# Patient Record
Sex: FEMALE | Race: WHITE | NOT HISPANIC OR LATINO | Employment: UNEMPLOYED | URBAN - METROPOLITAN AREA
[De-identification: names, ages, dates, MRNs, and addresses within clinical notes are randomized per-mention and may not be internally consistent; named-entity substitution may affect disease eponyms.]

---

## 2023-07-21 LAB
HEPATITIS B VIRUS SURFACE AG PRESENCE IN SERUM: NONREACTIVE
HEPATITIS C VIRUS AB PRESENCE IN SERUM: NONREACTIVE
HIV 1/ 2 AG/AB SCREEN: NONREACTIVE
SYPHILIS TOTAL AB: NONREACTIVE

## 2023-07-25 LAB
CHLAMYDIA TRACH., AMPLIFIED: NEGATIVE
N. GONORRHEA, AMPLIFIED: NEGATIVE

## 2023-09-29 ENCOUNTER — HOME HEALTH ADMISSION (OUTPATIENT)
Dept: HOME HEALTH SERVICES | Facility: HOME HEALTH | Age: 28
End: 2023-09-29
Payer: COMMERCIAL

## 2023-10-13 ENCOUNTER — HOME INFUSION (OUTPATIENT)
Dept: INFUSION THERAPY | Age: 28
End: 2023-10-13
Payer: COMMERCIAL

## 2023-10-13 NOTE — PROGRESS NOTES
Patient receiving Entyvio 300mg [id number], next infusion has been scheduled for 10/15/23. New order rec'd, good thru 9/5/24. Per previous note patient no longer uses benadryl premed and tylenol only sometimes.    Auth good through 12/31/23, October FO complete    Pharmacy to deliver 10/13 for 10/15 infusion as follows  #1 Entyvio vial  #1 SWFI vial  #1 NS 250ml bag  #1 NS 50ml bag  #2 APAP tabs    Next dose due approx 11/12    FU 11/06/23 to check next infusion date

## 2023-10-17 ENCOUNTER — HOME CARE VISIT (OUTPATIENT)
Dept: HOME HEALTH SERVICES | Facility: HOME HEALTH | Age: 28
End: 2023-10-17
Payer: COMMERCIAL

## 2023-10-17 VITALS
HEIGHT: 64 IN | SYSTOLIC BLOOD PRESSURE: 122 MMHG | DIASTOLIC BLOOD PRESSURE: 64 MMHG | RESPIRATION RATE: 12 BRPM | TEMPERATURE: 96.8 F | HEART RATE: 70 BPM | BODY MASS INDEX: 26.95 KG/M2

## 2023-10-17 PROCEDURE — G0299 HHS/HOSPICE OF RN EA 15 MIN: HCPCS

## 2023-10-17 ASSESSMENT — ENCOUNTER SYMPTOMS
DENIES PAIN: 1
LAST BOWEL MOVEMENT: 66764
APPETITE LEVEL: GOOD
LOSS OF SENSATION IN FEET: 0
DEPRESSION: 0
LOWEST PAIN SEVERITY IN PAST 24 HOURS: 0/10
OCCASIONAL FEELINGS OF UNSTEADINESS: 0
CHANGE IN APPETITE: UNCHANGED
HIGHEST PAIN SEVERITY IN PAST 24 HOURS: 2/10
STOOL FREQUENCY: DAILY
SUBJECTIVE PAIN PROGRESSION: UNCHANGED
PAIN SEVERITY GOAL: 0/10
PERSON REPORTING PAIN: PATIENT

## 2023-10-17 ASSESSMENT — PAIN SCALES - PAIN ASSESSMENT IN ADVANCED DEMENTIA (PAINAD)
BODYLANGUAGE: 0 - RELAXED.
FACIALEXPRESSION: 0 - SMILING OR INEXPRESSIVE.
TOTALSCORE: 0
FACIALEXPRESSION: 0
CONSOLABILITY: 0
CONSOLABILITY: 0 - NO NEED TO CONSOLE.
BREATHING: 0
BODYLANGUAGE: 0
NEGVOCALIZATION: 0 - NONE.
NEGVOCALIZATION: 0

## 2023-10-17 ASSESSMENT — ACTIVITIES OF DAILY LIVING (ADL)
OASIS_M1830: 00
AMBULATION ASSISTANCE: INDEPENDENT
ENTERING_EXITING_HOME: INDEPENDENT
AMBULATION ASSISTANCE: 1

## 2023-10-19 ENCOUNTER — HOME CARE VISIT (OUTPATIENT)
Dept: HOME HEALTH SERVICES | Facility: HOME HEALTH | Age: 28
End: 2023-10-19
Payer: COMMERCIAL

## 2023-10-19 ENCOUNTER — TELEPHONE (OUTPATIENT)
Dept: GASTROENTEROLOGY | Facility: CLINIC | Age: 28
End: 2023-10-19
Payer: COMMERCIAL

## 2023-10-19 VITALS
RESPIRATION RATE: 16 BRPM | SYSTOLIC BLOOD PRESSURE: 118 MMHG | DIASTOLIC BLOOD PRESSURE: 64 MMHG | TEMPERATURE: 98.4 F | HEART RATE: 78 BPM

## 2023-10-19 PROCEDURE — 400014 HH F/U

## 2023-10-19 PROCEDURE — G0299 HHS/HOSPICE OF RN EA 15 MIN: HCPCS

## 2023-10-19 SDOH — ECONOMIC STABILITY: GENERAL

## 2023-10-19 ASSESSMENT — PAIN SCALES - PAIN ASSESSMENT IN ADVANCED DEMENTIA (PAINAD)
CONSOLABILITY: 0
BODYLANGUAGE: 0
NEGVOCALIZATION: 0 - NONE.
CONSOLABILITY: 0 - NO NEED TO CONSOLE.
BODYLANGUAGE: 0 - RELAXED.
FACIALEXPRESSION: 0 - SMILING OR INEXPRESSIVE.
NEGVOCALIZATION: 0
FACIALEXPRESSION: 0
TOTALSCORE: 0
BREATHING: 0

## 2023-10-19 ASSESSMENT — ENCOUNTER SYMPTOMS
APPETITE LEVEL: FAIR
DENIES PAIN: 1
OCCASIONAL FEELINGS OF UNSTEADINESS: 0

## 2023-10-19 ASSESSMENT — ACTIVITIES OF DAILY LIVING (ADL): MONEY MANAGEMENT (EXPENSES/BILLS): INDEPENDENT

## 2023-10-19 NOTE — HOME HEALTH
sn arrived and attempted x3 to start peripheral line unscussesfully,  good blood flash and then will not flush.   called Dr Riverafor office and spoke with Alessia and reported above info.    requested need for alteranate solution to recieve entyvio soon, question mediport placement or infusion center for the short term .  awaiting a return call from md.   called  pharmacy to report that med not mixed or infused spoke with Kevin.

## 2023-10-19 NOTE — TELEPHONE ENCOUNTER
Marissa from [facility] called in to let us know that they were unable to get an IV started for patient to do her Entyvio treatment.  It was attempted 4 times on 10/13 and 3 times today.  They are wanting to know if you could give an order to get a port placed for this patient as she has always been a hard stick and it keeps getting harder.  They also want to know what to do about her not being able to get her Entyvio treatment today.    When calling [facility] - we will need to ask for team 7.

## 2023-10-25 PROCEDURE — G0179 MD RECERTIFICATION HHA PT: HCPCS | Performed by: INTERNAL MEDICINE

## 2023-11-03 ENCOUNTER — HOME INFUSION (OUTPATIENT)
Dept: INFUSION THERAPY | Age: 28
End: 2023-11-03
Payer: COMMERCIAL

## 2023-11-03 NOTE — PROGRESS NOTES
GI office still waiting for pt to schedule appt. RN has made multiple unsuccessful attempts at placing peripheral line to administer Entyvio.    [facility] to FU 11/13 to check appt status and pt needs

## 2023-11-08 ENCOUNTER — TELEPHONE (OUTPATIENT)
Dept: OBSTETRICS AND GYNECOLOGY | Facility: CLINIC | Age: 28
End: 2023-11-08
Payer: COMMERCIAL

## 2023-11-08 NOTE — TELEPHONE ENCOUNTER
LV with Beti was 7/20/2023  Patient states that urine urgency started after breaking her leg in 2021, states she did not have an internal catheter during this procedure but symptoms did start following this procedure.   Patient requesting a visit for evaluation, discuss symptoms and possible medical plan. Patient was scheduled 12/7/2023, she also requested I forward this information to Beti martinezase she wanted her seen sooner or if labs needed to be ordered or a referral to urology? Please advise.

## 2023-11-08 NOTE — TELEPHONE ENCOUNTER
Patient called stating she broke her leg years ago. She was given fentanyl. She doesn't know if it was the mass amount of anesthesia they gave her but her bladder control has not been the same since the surgery. The urgency is bad. No pain or blood when urinating. She would like to talk about this.

## 2023-11-09 NOTE — TELEPHONE ENCOUNTER
I am happy to see her on 12/7. We can determine if she needs a referral to urology then.   Thanks!

## 2023-11-13 ENCOUNTER — HOME INFUSION (OUTPATIENT)
Dept: INFUSION THERAPY | Age: 28
End: 2023-11-13
Payer: COMMERCIAL

## 2023-11-13 NOTE — PROGRESS NOTES
Rp spoke with pt, confirmed appt set up 12/19 with GI to discuss port placement. She also confirmed she was not able to receive infusion in October as RN was unable to get peripheral stick across multiple visits/practitioners. Confirmed medication is still in fridge at home. She is agreeable to holding infusions until discussing port placement with Dr Rasheed.     FU 12/19 with results of appt and forward in book as appropriate    Only saline bags will be needed at time of next fill

## 2023-11-17 ENCOUNTER — HOME CARE VISIT (OUTPATIENT)
Dept: HOME HEALTH SERVICES | Facility: HOME HEALTH | Age: 28
End: 2023-11-17
Payer: COMMERCIAL

## 2023-12-07 ENCOUNTER — OFFICE VISIT (OUTPATIENT)
Dept: OBSTETRICS AND GYNECOLOGY | Facility: CLINIC | Age: 28
End: 2023-12-07
Payer: COMMERCIAL

## 2023-12-07 VITALS — BODY MASS INDEX: 27.98 KG/M2 | WEIGHT: 163 LBS | SYSTOLIC BLOOD PRESSURE: 130 MMHG | DIASTOLIC BLOOD PRESSURE: 80 MMHG

## 2023-12-07 DIAGNOSIS — N89.8 VAGINAL DISCHARGE: ICD-10-CM

## 2023-12-07 DIAGNOSIS — R32 URINARY INCONTINENCE, UNSPECIFIED TYPE: Primary | ICD-10-CM

## 2023-12-07 DIAGNOSIS — N32.81 OAB (OVERACTIVE BLADDER): ICD-10-CM

## 2023-12-07 LAB
POC BILIRUBIN, URINE: NEGATIVE
POC BLOOD, URINE: NEGATIVE
POC GLUCOSE, URINE: NEGATIVE MG/DL
POC KETONES, URINE: NEGATIVE MG/DL
POC LEUKOCYTES, URINE: ABNORMAL
POC NITRITE,URINE: NEGATIVE
POC PH, URINE: 5 PH
POC PROTEIN, URINE: NEGATIVE MG/DL
POC SPECIFIC GRAVITY, URINE: 1.01
POC UROBILINOGEN, URINE: 0.2 EU/DL

## 2023-12-07 PROCEDURE — 99213 OFFICE O/P EST LOW 20 MIN: CPT | Performed by: NURSE PRACTITIONER

## 2023-12-07 PROCEDURE — 81003 URINALYSIS AUTO W/O SCOPE: CPT | Performed by: NURSE PRACTITIONER

## 2023-12-07 PROCEDURE — 87086 URINE CULTURE/COLONY COUNT: CPT

## 2023-12-07 PROCEDURE — 1036F TOBACCO NON-USER: CPT | Performed by: NURSE PRACTITIONER

## 2023-12-07 RX ORDER — FLUOXETINE HYDROCHLORIDE 40 MG/1
40 CAPSULE ORAL
COMMUNITY

## 2023-12-07 RX ORDER — DEXTROAMPHETAMINE SACCHARATE, AMPHETAMINE ASPARTATE, DEXTROAMPHETAMINE SULFATE AND AMPHETAMINE SULFATE 2.5; 2.5; 2.5; 2.5 MG/1; MG/1; MG/1; MG/1
10 TABLET ORAL 2 TIMES DAILY
COMMUNITY
Start: 2023-11-01

## 2023-12-07 RX ORDER — CLOTRIMAZOLE AND BETAMETHASONE DIPROPIONATE 10; .64 MG/G; MG/G
1 CREAM TOPICAL 2 TIMES DAILY
Qty: 6 G | Refills: 0 | Status: SHIPPED | OUTPATIENT
Start: 2023-12-07 | End: 2024-01-04

## 2023-12-07 RX ORDER — OXYBUTYNIN CHLORIDE 5 MG/1
5 TABLET, EXTENDED RELEASE ORAL DAILY
Qty: 30 TABLET | Refills: 5 | Status: SHIPPED | OUTPATIENT
Start: 2023-12-07 | End: 2024-03-29 | Stop reason: WASHOUT

## 2023-12-07 RX ORDER — ARIPIPRAZOLE 2 MG/1
2 TABLET ORAL DAILY
COMMUNITY
End: 2023-12-07 | Stop reason: SDUPTHER

## 2023-12-07 ASSESSMENT — ENCOUNTER SYMPTOMS
DIFFICULTY URINATING: 0
PSYCHIATRIC NEGATIVE: 1
FREQUENCY: 1
DYSURIA: 0
FLANK PAIN: 0
CONSTITUTIONAL NEGATIVE: 1
HEMATURIA: 0

## 2023-12-07 NOTE — PROGRESS NOTES
"Subjective   Patient ID: Krystina Wheatley is a 28 y.o. female who presents for Urinary Incontinence (X 2 year s/p surgery for tibia fracture repair/Last Annual Exam: 07/20/2023/Ascus/ Positive HPV 2023/Colposcopy / CELENA-1 2023/- patient is agreeable to nursing student-/).  HPI 28-year-old female presents with complaints of urinary urgency and incontinence for 2 years. She states 2 years ago she had emergency surgery for a tib/fib fx and noticed her symptoms began shortly after. She states she has never had an indwelling catheter. She reports experiencing incontinent or \"leaking\" episodes, specifically when she is hiking or driving. She denies strenuous activities triggering incontinent episodes. She denies excessive consumption of caffeine, tea or energy drinks. UA in the office showed moderate WBCs. Denies the need for STD testing.      Review of Systems   Constitutional: Negative.    Genitourinary:  Positive for frequency and urgency. Negative for difficulty urinating, dysuria, enuresis, flank pain, hematuria, pelvic pain, vaginal discharge and vaginal pain.   Skin: Negative.    Psychiatric/Behavioral: Negative.         Objective       Assessment/Plan   Diagnoses and all orders for this visit:  Discussed the following; will obtain urine culture. Will treat as needed pending results. Will treat for OAB. If symptoms do not improve, will refer to urology.   Urinary incontinence, unspecified type  -     POC Urine Dip  -     Urine culture  Vaginal discharge  -     clotrimazole-betamethasone (Lotrisone) cream; Apply 1 Application topically 2 times a day for 28 days.  OAB (overactive bladder)  -     oxybutynin XL (Ditropan-XL) 5 mg 24 hr tablet; Take 1 tablet (5 mg) by mouth once daily. Do not crush, chew, or split.  Follow-up in 6-8 weeks to re-evaluate symptoms.   Follow-up in the summer for annual exam and repeat PAP.        MATILDA Gardner-CNP 12/07/23 1:28 PM   "

## 2023-12-08 LAB — BACTERIA UR CULT: NO GROWTH

## 2023-12-15 ENCOUNTER — HOME CARE VISIT (OUTPATIENT)
Dept: HOME HEALTH SERVICES | Facility: HOME HEALTH | Age: 28
End: 2023-12-15
Payer: COMMERCIAL

## 2023-12-15 PROCEDURE — 400014 HH F/U

## 2023-12-16 VITALS
TEMPERATURE: 97.7 F | DIASTOLIC BLOOD PRESSURE: 64 MMHG | HEART RATE: 78 BPM | RESPIRATION RATE: 12 BRPM | SYSTOLIC BLOOD PRESSURE: 122 MMHG

## 2023-12-16 ASSESSMENT — ENCOUNTER SYMPTOMS
DEPRESSION: 0
CHANGE IN APPETITE: UNCHANGED
LOSS OF SENSATION IN FEET: 0
SUBJECTIVE PAIN PROGRESSION: UNCHANGED
DENIES PAIN: 1
OCCASIONAL FEELINGS OF UNSTEADINESS: 0
STOOL FREQUENCY: DAILY
PERSON REPORTING PAIN: PATIENT
HIGHEST PAIN SEVERITY IN PAST 24 HOURS: 0/10
LAST BOWEL MOVEMENT: 66823
APPETITE LEVEL: GOOD
CRAMPS: 1
FATIGUE: 1
LOWEST PAIN SEVERITY IN PAST 24 HOURS: 0/10
PAIN SEVERITY GOAL: 0/10

## 2023-12-16 ASSESSMENT — ACTIVITIES OF DAILY LIVING (ADL)
OASIS_M1830: 00
AMBULATION ASSISTANCE: INDEPENDENT
AMBULATION ASSISTANCE: 1
ENTERING_EXITING_HOME: INDEPENDENT

## 2023-12-18 ENCOUNTER — HOME INFUSION (OUTPATIENT)
Dept: INFUSION THERAPY | Age: 28
End: 2023-12-18
Payer: COMMERCIAL

## 2023-12-18 PROCEDURE — 400014 HH F/U

## 2023-12-18 NOTE — PROGRESS NOTES
Pt's GI appt got moved to 1/12/24. Per RN notes, Crohn's has been under control lately and pt is willing to wait until port placement to continue Entyvio infusions.    FU 1/12/24 on status of port placement, forward as appropriate

## 2023-12-19 ENCOUNTER — APPOINTMENT (OUTPATIENT)
Dept: GASTROENTEROLOGY | Facility: CLINIC | Age: 28
End: 2023-12-19
Payer: COMMERCIAL

## 2023-12-28 PROCEDURE — G0179 MD RECERTIFICATION HHA PT: HCPCS | Performed by: INTERNAL MEDICINE

## 2023-12-29 ENCOUNTER — HOME CARE VISIT (OUTPATIENT)
Dept: HOME HEALTH SERVICES | Facility: HOME HEALTH | Age: 28
End: 2023-12-29
Payer: COMMERCIAL

## 2024-01-12 ENCOUNTER — APPOINTMENT (OUTPATIENT)
Dept: GASTROENTEROLOGY | Facility: CLINIC | Age: 29
End: 2024-01-12
Payer: COMMERCIAL

## 2024-01-15 ENCOUNTER — TELEPHONE (OUTPATIENT)
Dept: INFUSION THERAPY | Age: 29
End: 2024-01-15
Payer: COMMERCIAL

## 2024-01-15 NOTE — TELEPHONE ENCOUNTER
Left detailed message asking patient if she would still like to delay Entyvio infusions until Mediport placed as this appointment has now been moved to end of January. Pharmacist concerned patient may be having breakthrough symptoms by then. Requested call back from patient either way.

## 2024-01-18 ENCOUNTER — HOME CARE VISIT (OUTPATIENT)
Dept: HOME HEALTH SERVICES | Facility: HOME HEALTH | Age: 29
End: 2024-01-18
Payer: COMMERCIAL

## 2024-01-29 ENCOUNTER — OFFICE VISIT (OUTPATIENT)
Dept: GASTROENTEROLOGY | Facility: CLINIC | Age: 29
End: 2024-01-29
Payer: COMMERCIAL

## 2024-01-29 ENCOUNTER — LAB (OUTPATIENT)
Dept: LAB | Facility: LAB | Age: 29
End: 2024-01-29
Payer: COMMERCIAL

## 2024-01-29 VITALS
HEIGHT: 64 IN | SYSTOLIC BLOOD PRESSURE: 137 MMHG | OXYGEN SATURATION: 98 % | BODY MASS INDEX: 27.66 KG/M2 | DIASTOLIC BLOOD PRESSURE: 100 MMHG | HEART RATE: 107 BPM | WEIGHT: 162 LBS

## 2024-01-29 DIAGNOSIS — K50.10 CROHN'S DISEASE OF LARGE INTESTINE WITHOUT COMPLICATION (MULTI): ICD-10-CM

## 2024-01-29 DIAGNOSIS — K50.10 CROHN'S DISEASE OF LARGE INTESTINE WITHOUT COMPLICATION (MULTI): Primary | ICD-10-CM

## 2024-01-29 PROCEDURE — 36415 COLL VENOUS BLD VENIPUNCTURE: CPT

## 2024-01-29 PROCEDURE — 86481 TB AG RESPONSE T-CELL SUSP: CPT

## 2024-01-29 PROCEDURE — 1036F TOBACCO NON-USER: CPT | Performed by: INTERNAL MEDICINE

## 2024-01-29 PROCEDURE — 99213 OFFICE O/P EST LOW 20 MIN: CPT | Performed by: INTERNAL MEDICINE

## 2024-01-29 NOTE — PROGRESS NOTES
Subjective   Patient ID: Krystina Wheatley is a 28 y.o. female who presents for Crohn's Disease (Follow up entyvio - discuss port for home infusions/).  HPI  The patient returns for follow-up review.  She has continued to do well on maintenance treatment with Entyvio for approximately the past 2 years.  Her maintenance treatment is with IV Entyvio Q 4 WEEKS.   The patient's main concern now is difficulty with obtaining and maintaining IV access.  As a result she has missed some doses of her maintenance therapy.  Otherwise she is essentially asymptomatic.  It is pertinent to note that her colonoscopic evaluations have shown an inflammatory pattern consistent with continuous inflammation from her anal verge to the level of the hepatic flexure.  Ascending colon has looked normal endoscopically but she had a discrete cecal patch of inflammation at colonoscopy in 2020 which therefore classified as Crohn's colitis.  Otherwise her pattern almost fit with ulcerative colitis.    Review of Systems  Constitutional: no fever, no chills, fatigue,  not feeling tired and no recent weight loss. No reports of dizziness.  SKIN: No skin rash or lesions  EYE: No pain photophobia, diplopia or blurred vision  EAR: No discharge, pain or hearing loss  NOSE: No congestion, epistaxis or obstruction  RESPIRATORY: No cough , dyspnea or  chest pain   CV: No chest pain, lower extremity swelling or palpitations  GE: No abdominal pain, nausea, vomiting, dysphagia or odynophagia. Denied constipation , diarrhea  : No dysuria or hematuria, urinary incontinence or urine frequency  NEURO: Denied weakness, confusion, dizziness, or numbness   PSYCH : Denies anxiety, hallucinations or insomnia  HEME/ LYMPH : Denied bleeding , bruising or enlarged nodes  ENDOCRINE: No change in weight, polydipsia, or abnormal bleeding  .       Objective   Physical Exam  Head and neck examinations were  unremarkable. There was no temporal wasting. No jaundice noted. No  thyromegaly.  No carotid bruits.  There is no peripheral lymphadenopathy.  Lungs were clear to auscultation. There when no rales, rhonchi or wheezes.  Cardiac examination revealed normal heart sounds. Rhythm was sinus . There were no murmurs.  Abdomen was full and soft. There was no organomegaly. Bowel sounds were normo- active. There was no ascites. The abdomen was nontender.  Rectal examination was not done.  Extremities: No edema or joint swelling.  Neurological examination: Patient was alert, oriented in person place, and time. There when no focal neurological signs. Cranial nerves were grossly intact. No evidence of encephalopathy. There was no asterixis. The  plantar response was flexor.      Assessment/Plan   28-year-old lady with diagnosis of Crohn's colitis involving essentially the colon from the anal verge to the hepatic flexure and then a small cecal patch.  She has responded quite well to maintenance therapy with Entyvio every 4 weeks.  Will challenge now is with difficulty obtaining and maintaining IV access for her maintenance therapy.  From my discussion confirm her historical response to Entyvio my option at this time would be to switch her to maintenance treatment with subcutaneous Entyvio injections.  Specific approval for subcutaneous Entyvio in Crohn's colitis is pending at this time.  I have spoken with the [employer] rep this morning.    Plan:  Continue IV maintenance therapy with Entyvio at this time.  I will be speaking with the [employer]  soon.  Check stool calprotectin level and TB spot test.  See patient back in 3 to 4 weeks.           Te Rasheed MD 01/29/24 10:56 AM

## 2024-01-29 NOTE — PATIENT INSTRUCTIONS
28-year-old lady with diagnosis of Crohn's colitis involving essentially the colon from the anal verge to the hepatic flexure and then a small cecal patch.  She has responded quite well to maintenance therapy with Entyvio every 4 weeks.  Will challenge now is with difficulty obtaining and maintaining IV access for her maintenance therapy.  From my discussion confirm her historical response to Entyvio my option at this time would be to switch her to maintenance treatment with subcutaneous Entyvio injections.  Specific approval for subcutaneous Entyvio in Crohn's colitis is pending at this time.  I have spoken with the [employer] rep this morning.    Plan:  Continue IV maintenance therapy with Entyvio at this time.  I will be speaking with the [employer]  soon.  Check stool calprotectin level and TB spot test.  See patient back in 3 to 4 weeks.

## 2024-01-31 ENCOUNTER — HOME INFUSION (OUTPATIENT)
Dept: INFUSION THERAPY | Age: 29
End: 2024-01-31
Payer: COMMERCIAL

## 2024-02-01 LAB
NIL(NEG) CONTROL SPOT COUNT: NORMAL
PANEL A SPOT COUNT: 0
PANEL B SPOT COUNT: 0
POS CONTROL SPOT COUNT: NORMAL
T-SPOT. TB INTERPRETATION: NEGATIVE

## 2024-02-01 NOTE — PROGRESS NOTES
Spoke with patient and she is doing well with no desire for Entyvio infusion at this time. MD is hopefully setting up subcutaneous Entyvio for patient.     Requests follow up by Pharmacy on 02/12/24 to check on progress and need for infusion. RN made aware of timetable.

## 2024-02-02 ENCOUNTER — HOME CARE VISIT (OUTPATIENT)
Dept: HOME HEALTH SERVICES | Facility: HOME HEALTH | Age: 29
End: 2024-02-02
Payer: COMMERCIAL

## 2024-02-02 ENCOUNTER — LAB (OUTPATIENT)
Dept: LAB | Facility: LAB | Age: 29
End: 2024-02-02
Payer: COMMERCIAL

## 2024-02-02 DIAGNOSIS — K50.10 CROHN'S DISEASE OF LARGE INTESTINE WITHOUT COMPLICATION (MULTI): ICD-10-CM

## 2024-02-02 PROCEDURE — 83993 ASSAY FOR CALPROTECTIN FECAL: CPT

## 2024-02-08 LAB — CALPROTECTIN STL-MCNT: 135 UG/G

## 2024-02-12 ENCOUNTER — HOME INFUSION (OUTPATIENT)
Dept: INFUSION THERAPY | Age: 29
End: 2024-02-12
Payer: COMMERCIAL

## 2024-02-12 NOTE — PROGRESS NOTES
Spoke with patient, no desire for Entyvio infusion at this time. Has not heard anything regarding switching to subQ Entyvio yet, she will follow up with MD office to see if a decision has been made. Next appointment with Dr. Rasheed on 2/29/24.    Requests follow up by pharmacy on 3/4/24 to check on progress and need for infusion.

## 2024-02-13 ENCOUNTER — TELEPHONE (OUTPATIENT)
Dept: GASTROENTEROLOGY | Facility: CLINIC | Age: 29
End: 2024-02-13

## 2024-02-13 ENCOUNTER — HOME CARE VISIT (OUTPATIENT)
Dept: HOME HEALTH SERVICES | Facility: HOME HEALTH | Age: 29
End: 2024-02-13
Payer: COMMERCIAL

## 2024-02-13 PROCEDURE — 400014 HH F/U

## 2024-02-13 PROCEDURE — G0299 HHS/HOSPICE OF RN EA 15 MIN: HCPCS

## 2024-02-13 NOTE — TELEPHONE ENCOUNTER
Patient is aware you are out of the office until 2.19.24    However she is wanting to know if you have heard anything else about moving her Entyvio to Subcutaneous?

## 2024-02-16 PROCEDURE — 400014 HH F/U

## 2024-02-19 VITALS
HEART RATE: 78 BPM | SYSTOLIC BLOOD PRESSURE: 126 MMHG | TEMPERATURE: 97.1 F | RESPIRATION RATE: 12 BRPM | DIASTOLIC BLOOD PRESSURE: 72 MMHG

## 2024-02-19 ASSESSMENT — ACTIVITIES OF DAILY LIVING (ADL)
AMBULATION ASSISTANCE: 1
AMBULATION ASSISTANCE: INDEPENDENT
ENTERING_EXITING_HOME: INDEPENDENT
OASIS_M1830: 00

## 2024-02-19 ASSESSMENT — ENCOUNTER SYMPTOMS
STOOL FREQUENCY: DAILY
CHANGE IN APPETITE: UNCHANGED
APPETITE LEVEL: GOOD
DENIES PAIN: 1
PERSON REPORTING PAIN: PATIENT
CRAMPS: 1
BOWEL PATTERN NORMAL: 1
LAST BOWEL MOVEMENT: 66883

## 2024-02-23 NOTE — TELEPHONE ENCOUNTER
Yury from [employer] called back and stated that until May 2024 Entyvio subcutaneous is not indicated for Crohn's . Right now it is only indicated for UC. He stated that once may is here the entyvio subcutaneous is on formulary for patient insurance. He stated unless you are willing to fight with the insurance for the next month there isn't anything we can do to get her the subcutaneous pen since it is not indicated for crohn's.  please advise.

## 2024-02-29 ENCOUNTER — OFFICE VISIT (OUTPATIENT)
Dept: GASTROENTEROLOGY | Facility: CLINIC | Age: 29
End: 2024-02-29
Payer: COMMERCIAL

## 2024-02-29 VITALS
OXYGEN SATURATION: 99 % | SYSTOLIC BLOOD PRESSURE: 127 MMHG | DIASTOLIC BLOOD PRESSURE: 90 MMHG | WEIGHT: 166 LBS | HEIGHT: 64 IN | BODY MASS INDEX: 28.34 KG/M2 | HEART RATE: 95 BPM

## 2024-02-29 DIAGNOSIS — K51.90 ULCERATIVE COLITIS WITHOUT COMPLICATIONS, UNSPECIFIED LOCATION (MULTI): Primary | ICD-10-CM

## 2024-02-29 PROCEDURE — 99213 OFFICE O/P EST LOW 20 MIN: CPT | Performed by: INTERNAL MEDICINE

## 2024-02-29 RX ORDER — BUDESONIDE 3 MG/1
9 CAPSULE, COATED PELLETS ORAL EVERY MORNING
Qty: 90 CAPSULE | Refills: 0 | Status: SHIPPED | OUTPATIENT
Start: 2024-02-29 | End: 2024-03-30

## 2024-02-29 NOTE — PATIENT INSTRUCTIONS
Ulcerative colitis patient initially involving the colon from the hepatic flexure to the anal verge.  She has responded to treatment with Entyvio for the past 2 years.  She was on Entyvio treatment every 4 weeks but has not had any treatment since November 2023.  This is mainly because of the lack of venous access.    I am still waiting to hear from the [facility] therapeutic representative regarding the conversion scheme for switching from IV Entyvio every 4 weeks to subcutaneous delivery.    For now I will start the patient on budesonide 9 mg daily to prevent any sudden flare of her colitis prior to securing this other form of Entyvio if approved.

## 2024-02-29 NOTE — PROGRESS NOTES
Subjective   Patient ID: Krystina Wheatley is a 28 y.o. female who presents for Follow-up (Crohns - discuss entyvio injections, unable to get due to coverage/).  HPI  The patient returns today for follow-up visit.  She has a diagnosis of ulcerative colitis extending from the anal verge to the hepatic flexure at initial colonoscopy in 2020.  At that examination there was also a focal patch of inflammation around the Mars appendiceal orifice.  Multiple biopsies from the colon at the time confirmed focal active colitis pattern.  No granulomas were identified.  A CT enterography done at the time had excluded small bowel disease.  However biopsy from the periappendiceal orifice also reported focal active colitis pattern.  Essentially I have treated patient as ulcerative colitis with a cecal patch.  She has responded very well to Entyvio treatment however has needed decreased interval dosing and gets Entyvio every 4 weeks.  On this regimen her control has been near perfect with normal bowel movements, no blood in stool and no abdominal pain.  However a significant problem has been venous access and the infusion nurses are having difficulty starting her infusion.  As a result she has not had Entyvio treatment since November 2023.  The patient is at this time resistant to the idea of placing an intravenous port for infusion therapy.  Currently reported 2-3 formed bowel movements daily without blood or mucus.  She denied abdominal pain.    Review of Systems  Constitutional: no fever, no chills, fatigue,  not feeling tired and no recent weight loss. No reports of dizziness.  SKIN: No skin rash or lesions  EYE: No pain photophobia, diplopia or blurred vision  EAR: No discharge, pain or hearing loss  NOSE: No congestion, epistaxis or obstruction  RESPIRATORY: No cough , dyspnea or  chest pain   CV: No chest pain, lower extremity swelling or palpitations  GE: No abdominal pain, nausea, vomiting, dysphagia or odynophagia.  Denied constipation , diarrhea  : No dysuria or hematuria, urinary incontinence or urine frequency  NEURO: Denied weakness, confusion, dizziness, or numbness   PSYCH : Denies anxiety, hallucinations or insomnia  HEME/ LYMPH : Denied bleeding , bruising or enlarged nodes  ENDOCRINE: No change in weight, polydipsia, or abnormal bleeding  .       Objective   Physical Exam  Head and neck examinations were  unremarkable. There was no temporal wasting. No jaundice noted. No thyromegaly.  No carotid bruits.  There is no peripheral lymphadenopathy.  Lungs were clear to auscultation. There when no rales, rhonchi or wheezes.  Cardiac examination revealed normal heart sounds. Rhythm was sinus . There were no murmurs.  Abdomen was full and soft. There was no organomegaly. Bowel sounds were normo- active. There was no ascites. The abdomen was nontender.  Rectal examination was not done.  Extremities: No edema or joint swelling.  Neurological examination: Patient was alert, oriented in person place, and time. There when no focal neurological signs. Cranial nerves were grossly intact. No evidence of encephalopathy. There was no asterixis. The  plantar response was flexor.    Assessment/Plan   Ulcerative colitis patient initially involving the colon from the hepatic flexure to the anal verge.  She has responded to treatment with Entyvio for the past 2 years.  She was on Entyvio treatment every 4 weeks but has not had any treatment since November 2023.  This is mainly because of the lack of venous access.    I am still waiting to hear from the [facility] therapeutic representative regarding the conversion scheme for switching from IV Entyvio every 4 weeks to subcutaneous delivery.    For now I will start the patient on budesonide 9 mg daily to prevent any sudden flare of her colitis prior to securing this other form of Entyvio if approved.           Te Rasheed MD 02/29/24 10:23 AM

## 2024-03-04 ENCOUNTER — HOME INFUSION (OUTPATIENT)
Dept: INFUSION THERAPY | Age: 29
End: 2024-03-04
Payer: COMMERCIAL

## 2024-03-04 ENCOUNTER — TELEPHONE (OUTPATIENT)
Dept: GASTROENTEROLOGY | Facility: CLINIC | Age: 29
End: 2024-03-04

## 2024-03-04 NOTE — TELEPHONE ENCOUNTER
Patient called in stated that she has not yet met her deductible and the budesonide you sent in is $455. With a good rx coupon the pharmacy can get it down to roughly $$60 every month. The patient stated that she is not working right now. Is there another medication that we can send in? Please advise.

## 2024-03-04 NOTE — PROGRESS NOTES
Rp spoke with pt, confirmed she will try PO budesonide to manage symptoms until able to start subcutaneous Entyvio. Per pt, approval is likely to come this week. No infusion needs at this time.    FU 3/26 to check if able to start subcutaneous Entyvio, discharge from service as appropriate

## 2024-03-08 PROCEDURE — G0179 MD RECERTIFICATION HHA PT: HCPCS | Performed by: INTERNAL MEDICINE

## 2024-03-29 ENCOUNTER — LAB (OUTPATIENT)
Dept: LAB | Facility: LAB | Age: 29
End: 2024-03-29
Payer: COMMERCIAL

## 2024-03-29 ENCOUNTER — OFFICE VISIT (OUTPATIENT)
Dept: OBSTETRICS AND GYNECOLOGY | Facility: CLINIC | Age: 29
End: 2024-03-29
Payer: COMMERCIAL

## 2024-03-29 VITALS — WEIGHT: 170 LBS | SYSTOLIC BLOOD PRESSURE: 120 MMHG | DIASTOLIC BLOOD PRESSURE: 80 MMHG | BODY MASS INDEX: 29.18 KG/M2

## 2024-03-29 DIAGNOSIS — Z11.3 SCREEN FOR STD (SEXUALLY TRANSMITTED DISEASE): Primary | ICD-10-CM

## 2024-03-29 DIAGNOSIS — N76.0 ACUTE VAGINITIS: ICD-10-CM

## 2024-03-29 DIAGNOSIS — Z11.3 SCREEN FOR STD (SEXUALLY TRANSMITTED DISEASE): ICD-10-CM

## 2024-03-29 DIAGNOSIS — A59.9 TRICHOMONIASIS: ICD-10-CM

## 2024-03-29 PROCEDURE — 99214 OFFICE O/P EST MOD 30 MIN: CPT | Performed by: NURSE PRACTITIONER

## 2024-03-29 PROCEDURE — 86803 HEPATITIS C AB TEST: CPT

## 2024-03-29 PROCEDURE — 87389 HIV-1 AG W/HIV-1&-2 AB AG IA: CPT

## 2024-03-29 PROCEDURE — 86780 TREPONEMA PALLIDUM: CPT

## 2024-03-29 PROCEDURE — 36415 COLL VENOUS BLD VENIPUNCTURE: CPT

## 2024-03-29 PROCEDURE — 87340 HEPATITIS B SURFACE AG IA: CPT

## 2024-03-29 PROCEDURE — 87800 DETECT AGNT MULT DNA DIREC: CPT

## 2024-03-29 PROCEDURE — 87661 TRICHOMONAS VAGINALIS AMPLIF: CPT

## 2024-03-29 PROCEDURE — 1036F TOBACCO NON-USER: CPT | Performed by: NURSE PRACTITIONER

## 2024-03-29 PROCEDURE — 87205 SMEAR GRAM STAIN: CPT

## 2024-03-29 RX ORDER — CETIRIZINE HYDROCHLORIDE 10 MG/1
TABLET ORAL
COMMUNITY
Start: 2024-03-26

## 2024-03-29 RX ORDER — DEXTROAMPHETAMINE SACCHARATE, AMPHETAMINE ASPARTATE MONOHYDRATE, DEXTROAMPHETAMINE SULFATE AND AMPHETAMINE SULFATE 5; 5; 5; 5 MG/1; MG/1; MG/1; MG/1
20 CAPSULE, EXTENDED RELEASE ORAL
COMMUNITY
Start: 2024-03-07

## 2024-03-29 RX ORDER — CLOTRIMAZOLE AND BETAMETHASONE DIPROPIONATE 10; .64 MG/G; MG/G
1 CREAM TOPICAL 2 TIMES DAILY
Qty: 45 G | Refills: 1 | Status: SHIPPED | OUTPATIENT
Start: 2024-03-29 | End: 2024-04-26

## 2024-03-29 NOTE — PROGRESS NOTES
Clotrimazole  Subjective   Krystina Wheatley is a 28 y.o. female who complains of vaginal discharge/itch.    HPI:  Here with c/o vaginal odor and irritation. She recently had a change in partners. Symptoms started after they were sexually active. She would like STD testing today.   Symptoms reported: vaginal discharge, foul vaginal odor, and vaginal itching  Onset: several weeks ago  Course: intermittent  Progression: stayed the same    Helpful treatments: none  Unhelpful treatments tried: none    Objective   Physical Exam:   General Appearance: alert and oriented, in no acute distress  Abdomen: soft, non-tender; bowel sounds normal; no masses, no organomegaly  Pelvic Exam: external genitalia normal, uterus normal size, shape, and consistency, no cervical motion tenderness, cervix normal in appearance, no adnexal masses or tenderness, rectovaginal septum normal, and positive findings: vaginal discharge: scant and white  Urine dipstick: not done.    Assessment/Plan   Diagnoses and all orders for this visit:  Screen for STD (sexually transmitted disease)  -     C. Trachomatis / N. Gonorrhoeae, Amplified Detection; Future  -     Trichomonas Wet Prep Reflex to PCR; Future  -     Hepatitis B surface Ag; Future  -     HIV 1/2 Antigen/Antibody Screen with Reflex to Confirmation; Future  -     Syphilis Screen with Reflex; Future  -     Hepatitis C Antibody; Future  Acute vaginitis  -     Vaginitis Gram Stain For Bacterial Vaginosis + Yeast; Future  Will treat as needed pending results of testing. Pt will schedule annual after 7/20/24- pt due for repeat PAP after colposcopy.

## 2024-03-29 NOTE — PROGRESS NOTES
Subjective   Krystina Wheatley is a 28 y.o. female who complains of vaginal discharge/itch.    HPI:  Symptoms reported: vaginal odor, discharge, and vaginal itching  Onset: 1 month ago  Course: intermittent  Progression: Patient states that she has noticed discharge and itching that comes and goes. She states that the discharge is thin and yellowish. She also has noticed an odor that seems to come and go. The patient states that she has had a new sexual partner for the last 4-5 months today. She is interested in STD testing today.     Patient also reports that she experienced a full body rash a week ago. She is unsure on what caused the rash. She states that she did not have any changes in hygiene products. She went to the dermatologist and was told to start taking Zrytec which seemed to help.    Helpful treatments: none  Unhelpful treatments tried: none    Objective   Physical Exam:   General Appearance: alert and oriented, in no acute distress  Abdomen: soft, non-tender; bowel sounds normal; no masses, no organomegaly  Pelvic Exam: external genitalia normal, uterus normal size, shape, and consistency, no cervical motion tenderness, cervix normal in appearance, no adnexal masses or tenderness, and rectovaginal septum normal. Scant thin yellowish discharge present.       Assessment/Plan   Diagnoses and all orders for this visit:  Screen for STD (sexually transmitted disease)  -     C. Trachomatis / N. Gonorrhoeae, Amplified Detection; Future  -     Trichomonas Wet Prep Reflex to PCR; Future  -     Hepatitis B surface Ag; Future  -     HIV 1/2 Antigen/Antibody Screen with Reflex to Confirmation; Future  -     Syphilis Screen with Reflex; Future  -     Hepatitis C Antibody; Future  Acute vaginitis  -     Vaginitis Gram Stain For Bacterial Vaginosis + Yeast; Future

## 2024-03-30 LAB
C TRACH RRNA SPEC QL NAA+PROBE: NEGATIVE
CLUE CELLS VAG LPF-#/AREA: PRESENT /[LPF]
HBV SURFACE AG SERPL QL IA: NONREACTIVE
HCV AB SER QL: NONREACTIVE
HIV 1+2 AB+HIV1 P24 AG SERPL QL IA: NONREACTIVE
N GONORRHOEA DNA SPEC QL PROBE+SIG AMP: NEGATIVE
NUGENT SCORE: 8
T VAGINALIS RRNA SPEC QL NAA+PROBE: POSITIVE
TREPONEMA PALLIDUM IGG+IGM AB [PRESENCE] IN SERUM OR PLASMA BY IMMUNOASSAY: NONREACTIVE
YEAST VAG WET PREP-#/AREA: ABNORMAL

## 2024-04-01 RX ORDER — METRONIDAZOLE 500 MG/1
500 TABLET ORAL 2 TIMES DAILY
Qty: 14 TABLET | Refills: 0 | Status: SHIPPED | OUTPATIENT
Start: 2024-04-01 | End: 2024-04-08

## 2024-04-09 ENCOUNTER — HOME INFUSION (OUTPATIENT)
Dept: INFUSION THERAPY | Age: 29
End: 2024-04-09
Payer: COMMERCIAL

## 2024-04-09 NOTE — PROGRESS NOTES
[facility] has made several phone calls to pt to assess progress with subcutaneous Entyvio access/coverage. Per most recent conversation with pt, she is using PO budesonide to manage symptoms and has no plans for port placement or resuming  IV Entyvio. No response to last few calls.    RN called pharmacy today and pt due for recert. With no apparent infusion needs, [facility] nursing will proceed with discharging pt from services. With no response to today's phone call, pharmacy will discharge pt from services. VM left for pt to make arrangements with MD for new pharmacy/nursing orders if needed.    Pt care rep to discharge pt from [facility] and discharge chart

## 2024-04-12 ENCOUNTER — HOME CARE VISIT (OUTPATIENT)
Dept: HOME HEALTH SERVICES | Facility: HOME HEALTH | Age: 29
End: 2024-04-12
Payer: COMMERCIAL

## 2024-04-15 ENCOUNTER — HOME CARE VISIT (OUTPATIENT)
Dept: HOME HEALTH SERVICES | Facility: HOME HEALTH | Age: 29
End: 2024-04-15
Payer: COMMERCIAL

## 2024-04-16 ASSESSMENT — ENCOUNTER SYMPTOMS
PERSON REPORTING PAIN: PATIENT
DENIES PAIN: 1

## 2024-04-16 ASSESSMENT — ACTIVITIES OF DAILY LIVING (ADL)
OASIS_M1830: 00
AMBULATION ASSISTANCE: INDEPENDENT
AMBULATION ASSISTANCE: 1
HOME_HEALTH_OASIS: 00

## 2024-04-19 ENCOUNTER — TELEPHONE (OUTPATIENT)
Dept: GASTROENTEROLOGY | Facility: CLINIC | Age: 29
End: 2024-04-19

## 2024-04-19 NOTE — TELEPHONE ENCOUNTER
I received an email today stating that enytvio pen is now indicated for crohn's. Please send new prescription to  specialty pharmacy so we can get this process started for patient.

## 2024-06-20 ENCOUNTER — TELEPHONE (OUTPATIENT)
Dept: GASTROENTEROLOGY | Facility: CLINIC | Age: 29
End: 2024-06-20
Payer: COMMERCIAL

## 2024-06-20 DIAGNOSIS — K51.90 ULCERATIVE COLITIS WITHOUT COMPLICATIONS, UNSPECIFIED LOCATION (MULTI): ICD-10-CM

## 2024-06-20 RX ORDER — BUDESONIDE 3 MG/1
9 CAPSULE, COATED PELLETS ORAL EVERY MORNING
Qty: 90 CAPSULE | Refills: 0 | Status: SHIPPED | OUTPATIENT
Start: 2024-06-20 | End: 2024-07-20

## 2024-06-20 NOTE — TELEPHONE ENCOUNTER
"Patient of Dr. Rasheed and has a follow up with him next week.  She was hoping to get a short term supply of budesonide called in as \"she is out of everything\"  She uses [facility] in [place].    "

## 2024-06-24 ENCOUNTER — APPOINTMENT (OUTPATIENT)
Dept: GASTROENTEROLOGY | Facility: CLINIC | Age: 29
End: 2024-06-24
Payer: COMMERCIAL

## 2024-06-24 VITALS
HEART RATE: 100 BPM | BODY MASS INDEX: 30.73 KG/M2 | SYSTOLIC BLOOD PRESSURE: 126 MMHG | WEIGHT: 180 LBS | DIASTOLIC BLOOD PRESSURE: 89 MMHG | HEIGHT: 64 IN | OXYGEN SATURATION: 98 %

## 2024-06-24 DIAGNOSIS — K50.10 CROHN'S DISEASE OF LARGE INTESTINE WITHOUT COMPLICATION (MULTI): Primary | ICD-10-CM

## 2024-06-24 PROCEDURE — 99213 OFFICE O/P EST LOW 20 MIN: CPT | Performed by: INTERNAL MEDICINE

## 2024-06-24 PROCEDURE — 1036F TOBACCO NON-USER: CPT | Performed by: INTERNAL MEDICINE

## 2024-06-24 NOTE — PROGRESS NOTES
Subjective   Patient ID: Krystina Wheatley is a 28 y.o. female who presents for Follow-up (Is currently taking budesonide ) and Med Management (Discuss enytvio pen / infusions ).  HPI  The patient returns today for reevaluation of her clinical progress and review of her treatment.  She has a diagnosis of Crohn's Colitis, made as a patient to the [facility].  I assumed her care in 2020 at which time she was already on treatment with Entyvio infusions every 4 weeks.  The records indicated that Entyvio treatment every 4 weeks was approved for her through the [facility] after a long and tedious review process.    A CT enterography done on 5/15/2020 had reported suspected colitis involving the sigmoid colon and transverse colon.  CT images of the small intestine were normal.  I continued her on the same regimen of Entyvio infusions every 4 weeks which she did quite well clinically.  Her last colonoscopy examination was done on 12/29/2022 and revealed no mucosal abnormalities.  However over time difficulties arose due to loss of ready vascular access for her Entyvio infusions.  The patient was for a period of time resistant to the idea of placing a port for infusion therapies.  Her last Entyvio infusion therapy was in November 2023 and gradual after several months her symptoms began to flare slowly with increasing bowel movements up to 6-10 a day with occasional blood.    She had shown very good clinical response to Entyvio and I initially put her on a bridging treatment with budesonide pending approval of Entyvio for subcutaneous use.      Review of Systems  Constitutional: no fever, no chills, fatigue,  not feeling tired and no recent weight loss. No reports of dizziness.  SKIN: No skin rash or lesions  EYE: No pain photophobia, diplopia or blurred vision  EAR: No discharge, pain or hearing loss  NOSE: No congestion, epistaxis or obstruction  RESPIRATORY: No cough , dyspnea or  chest pain   CV:  No chest pain, lower extremity swelling or palpitations  GE: No abdominal pain, nausea, vomiting, dysphagia or odynophagia. Denied constipation , occasional diarrhea with 4-6 bowel movements per day  : No dysuria or hematuria, urinary incontinence or urine frequency  NEURO: Denied weakness, confusion, dizziness, or numbness   PSYCH : Denies anxiety, hallucinations or insomnia  HEME/ LYMPH : Denied bleeding , bruising or enlarged nodes  ENDOCRINE: No change in weight, polydipsia, or abnormal bleeding  .     Objective   Physical Exam  Head and neck examinations were  unremarkable. There was no temporal wasting. No jaundice noted. No thyromegaly.  No carotid bruits.  There is no peripheral lymphadenopathy.  Lungs were clear to auscultation. There when no rales, rhonchi or wheezes.  Cardiac examination revealed normal heart sounds. Rhythm was sinus . There were no murmurs.  Abdomen was full and soft. There was no organomegaly. Bowel sounds were normo- active. There was no ascites. The abdomen was nontender.  Rectal examination was not done.  Extremities: No edema or joint swelling.  Neurological examination: Patient was alert, oriented in person place, and time. There when no focal neurological signs. Cranial nerves were grossly intact. No evidence of encephalopathy. There was no asterixis. The  plantar response was flexor.    Assessment/Plan   28-year-old patient with history of Crohn's colitis who has been well-maintained on Entyvio infusions every 4 weeks for the past 4+ years.  She lost vascular access and has not had her last  therapy was in November 2023.  Currently I have a bridging treatment with budesonide 9 mg orally daily.  This is offered her some relief of symptoms.  I reviewed in detail with the patient today that Entyvio is now approved for subcutaneous maintenance therapy.  She has agreed now to placement of central venous catheter for reinduction Entyvio therapy .    PLAN:  Continue budesonide 9 mg  orally daily for now.    Reorder Entyvio infusions through a central line at Time zero and later repeat time 14 days    After visit and discussions today she has agreed to temporary use of a central vascular cathetar to resume Entyvio therapy.    Continue subcutaneous Entyvio Q 4 weeks after induction treatment above.    Consult and request to vascular surgery for placement of central venous catheter as soon as possible.    Will send order for 2 loading doses of Entyvio therapy as soon as central venous port is in place.           Te Rasheed MD 06/24/24 3:10 PM

## 2024-06-24 NOTE — H&P (VIEW-ONLY)
Subjective   Patient ID: Krystina Wheatley is a 28 y.o. female who presents for Follow-up (Is currently taking budesonide ) and Med Management (Discuss enytvio pen / infusions ).  HPI  The patient returns today for reevaluation of her clinical progress and review of her treatment.  She has a diagnosis of Crohn's Colitis, made as a patient to the [facility].  I assumed her care in 2020 at which time she was already on treatment with Entyvio infusions every 4 weeks.  The records indicated that Entyvio treatment every 4 weeks was approved for her through the [facility] after a long and tedious review process.    A CT enterography done on 5/15/2020 had reported suspected colitis involving the sigmoid colon and transverse colon.  CT images of the small intestine were normal.  I continued her on the same regimen of Entyvio infusions every 4 weeks which she did quite well clinically.  Her last colonoscopy examination was done on 12/29/2022 and revealed no mucosal abnormalities.  However over time difficulties arose due to loss of ready vascular access for her Entyvio infusions.  The patient was for a period of time resistant to the idea of placing a port for infusion therapies.  Her last Entyvio infusion therapy was in November 2023 and gradual after several months her symptoms began to flare slowly with increasing bowel movements up to 6-10 a day with occasional blood.    She had shown very good clinical response to Entyvio and I initially put her on a bridging treatment with budesonide pending approval of Entyvio for subcutaneous use.      Review of Systems  Constitutional: no fever, no chills, fatigue,  not feeling tired and no recent weight loss. No reports of dizziness.  SKIN: No skin rash or lesions  EYE: No pain photophobia, diplopia or blurred vision  EAR: No discharge, pain or hearing loss  NOSE: No congestion, epistaxis or obstruction  RESPIRATORY: No cough , dyspnea or  chest pain   CV:  No chest pain, lower extremity swelling or palpitations  GE: No abdominal pain, nausea, vomiting, dysphagia or odynophagia. Denied constipation , occasional diarrhea with 4-6 bowel movements per day  : No dysuria or hematuria, urinary incontinence or urine frequency  NEURO: Denied weakness, confusion, dizziness, or numbness   PSYCH : Denies anxiety, hallucinations or insomnia  HEME/ LYMPH : Denied bleeding , bruising or enlarged nodes  ENDOCRINE: No change in weight, polydipsia, or abnormal bleeding  .     Objective   Physical Exam  Head and neck examinations were  unremarkable. There was no temporal wasting. No jaundice noted. No thyromegaly.  No carotid bruits.  There is no peripheral lymphadenopathy.  Lungs were clear to auscultation. There when no rales, rhonchi or wheezes.  Cardiac examination revealed normal heart sounds. Rhythm was sinus . There were no murmurs.  Abdomen was full and soft. There was no organomegaly. Bowel sounds were normo- active. There was no ascites. The abdomen was nontender.  Rectal examination was not done.  Extremities: No edema or joint swelling.  Neurological examination: Patient was alert, oriented in person place, and time. There when no focal neurological signs. Cranial nerves were grossly intact. No evidence of encephalopathy. There was no asterixis. The  plantar response was flexor.    Assessment/Plan   28-year-old patient with history of Crohn's colitis who has been well-maintained on Entyvio infusions every 4 weeks for the past 4+ years.  She lost vascular access and has not had her last  therapy was in November 2023.  Currently I have a bridging treatment with budesonide 9 mg orally daily.  This is offered her some relief of symptoms.  I reviewed in detail with the patient today that Entyvio is now approved for subcutaneous maintenance therapy.  She has agreed now to placement of central venous catheter for reinduction Entyvio therapy .    PLAN:  Continue budesonide 9 mg  orally daily for now.    Reorder Entyvio infusions through a central line at Time zero and later repeat time 14 days    After visit and discussions today she has agreed to temporary use of a central vascular cathetar to resume Entyvio therapy.    Continue subcutaneous Entyvio Q 4 weeks after induction treatment above.    Consult and request to vascular surgery for placement of central venous catheter as soon as possible.    Will send order for 2 loading doses of Entyvio therapy as soon as central venous port is in place.           Te Rasheed MD 06/24/24 3:10 PM

## 2024-06-24 NOTE — PATIENT INSTRUCTIONS
Continue budesonide 9 mg orally daily for now.    Reorder Entyvio infusions through a central line at Time zero and later repeat time 14 days    After visit and discussions today she has agreed to temporary use of a central vascular cathetar to resume Entyvio therapy.    Continue subcutaneous Entyvio Q 4 weeks after induction treatment above.    Consult and request to vascular surgery for placement of central venous catheter as soon as possible.    Will send order for 2 loading doses of Entyvio therapy as soon as central venous port is in place.

## 2024-06-26 ENCOUNTER — TELEPHONE (OUTPATIENT)
Dept: GASTROENTEROLOGY | Facility: CLINIC | Age: 29
End: 2024-06-26
Payer: COMMERCIAL

## 2024-06-26 DIAGNOSIS — K50.10 CROHN'S DISEASE OF COLON WITHOUT COMPLICATION (MULTI): Primary | ICD-10-CM

## 2024-06-26 NOTE — TELEPHONE ENCOUNTER
Tanya from Vascular surgery main [facility] called to let us know that they do not do port placement. This needs to come from interventional radiology. Please change referral for pt .

## 2024-07-02 ENCOUNTER — TELEPHONE (OUTPATIENT)
Dept: GASTROENTEROLOGY | Facility: CLINIC | Age: 29
End: 2024-07-02
Payer: COMMERCIAL

## 2024-07-02 NOTE — TELEPHONE ENCOUNTER
Left message on machine to return call  for pt -     We received a letter from pt insurance stating that they were going to drop her if her invoice for them was not paid. We have been trying to get enytvio for her.     I have printed an enytvio pt assistance for pt to complete then bring back to the office so we can send it in to see if we can get the medication for free for her.

## 2024-07-05 ENCOUNTER — HOSPITAL ENCOUNTER (OUTPATIENT)
Dept: CARDIOLOGY | Facility: HOSPITAL | Age: 29
Discharge: HOME | End: 2024-07-05
Payer: COMMERCIAL

## 2024-07-05 VITALS
BODY MASS INDEX: 30.71 KG/M2 | HEART RATE: 78 BPM | TEMPERATURE: 98.3 F | RESPIRATION RATE: 16 BRPM | WEIGHT: 179.9 LBS | HEIGHT: 64 IN | SYSTOLIC BLOOD PRESSURE: 128 MMHG | OXYGEN SATURATION: 99 % | DIASTOLIC BLOOD PRESSURE: 81 MMHG

## 2024-07-05 DIAGNOSIS — K50.10 CROHN'S DISEASE OF COLON WITHOUT COMPLICATION (MULTI): ICD-10-CM

## 2024-07-05 PROCEDURE — 2720000007 HC OR 272 NO HCPCS

## 2024-07-05 PROCEDURE — 36561 INSERT TUNNELED CV CATH: CPT | Performed by: RADIOLOGY

## 2024-07-05 PROCEDURE — 2780000003 HC OR 278 NO HCPCS

## 2024-07-05 PROCEDURE — C1894 INTRO/SHEATH, NON-LASER: HCPCS

## 2024-07-05 PROCEDURE — 99152 MOD SED SAME PHYS/QHP 5/>YRS: CPT

## 2024-07-05 PROCEDURE — 76937 US GUIDE VASCULAR ACCESS: CPT | Performed by: RADIOLOGY

## 2024-07-05 PROCEDURE — 99152 MOD SED SAME PHYS/QHP 5/>YRS: CPT | Performed by: RADIOLOGY

## 2024-07-05 PROCEDURE — 2500000005 HC RX 250 GENERAL PHARMACY W/O HCPCS: Performed by: RADIOLOGY

## 2024-07-05 PROCEDURE — C1788 PORT, INDWELLING, IMP: HCPCS

## 2024-07-05 PROCEDURE — 7100000010 HC PHASE TWO TIME - EACH INCREMENTAL 1 MINUTE

## 2024-07-05 PROCEDURE — 36010 PLACE CATHETER IN VEIN: CPT | Performed by: RADIOLOGY

## 2024-07-05 PROCEDURE — 7100000009 HC PHASE TWO TIME - INITIAL BASE CHARGE

## 2024-07-05 PROCEDURE — 2500000004 HC RX 250 GENERAL PHARMACY W/ HCPCS (ALT 636 FOR OP/ED): Performed by: RADIOLOGY

## 2024-07-05 RX ORDER — LIDOCAINE HYDROCHLORIDE 20 MG/ML
INJECTION, SOLUTION EPIDURAL; INFILTRATION; INTRACAUDAL; PERINEURAL
Status: COMPLETED | OUTPATIENT
Start: 2024-07-05 | End: 2024-07-05

## 2024-07-05 RX ORDER — MIDAZOLAM HYDROCHLORIDE 1 MG/ML
INJECTION, SOLUTION INTRAMUSCULAR; INTRAVENOUS
Status: COMPLETED | OUTPATIENT
Start: 2024-07-05 | End: 2024-07-05

## 2024-07-05 RX ORDER — HEPARIN 100 UNIT/ML
SYRINGE INTRAVENOUS
Status: COMPLETED | OUTPATIENT
Start: 2024-07-05 | End: 2024-07-05

## 2024-07-05 RX ORDER — FENTANYL CITRATE 50 UG/ML
INJECTION, SOLUTION INTRAMUSCULAR; INTRAVENOUS
Status: COMPLETED | OUTPATIENT
Start: 2024-07-05 | End: 2024-07-05

## 2024-07-05 ASSESSMENT — PAIN SCALES - GENERAL
PAINLEVEL_OUTOF10: 0 - NO PAIN

## 2024-07-05 ASSESSMENT — PAIN - FUNCTIONAL ASSESSMENT
PAIN_FUNCTIONAL_ASSESSMENT: 0-10

## 2024-07-05 NOTE — POST-PROCEDURE NOTE
Interventional Radiology Brief Postprocedure Note    Attending: Ambrosio Watson MD      Assistant: none    Diagnosis: crohns disease    Description of procedure: port placement     Anesthesia:  Local    Complications: None    Estimated Blood Loss: minimal    No specimens collected      See detailed result report with images in PACS.    The patient tolerated the procedure well without incident or complication.

## 2024-07-05 NOTE — DISCHARGE INSTRUCTIONS
Instructions after Your Port Placement   Today, you had your port placed in Interventional Radiology/Specials department. Your port will be used for blood draws, imaging studies like CT-scans or MRIs and used for infusions and/ or chemotherapy etc.     Activity  For the first day, rest with light walking as tolerated. You might feel more tired than usual.   Do not lift anything heavier than 10 lbs. (around the weight of 1 gallon milk jug) for 24 hours. You can lift weights heavier than 10 lbs. on _7/6/2024______      .  Do not drive for the first 24 hours. You can drive on ____7/6/24____________.   The following day after the procedure, you can get back to your normal activities.    Port incision and neck area care   Keep your dressing over your chest area clean, dry and in place for 5-7 days. If you have a scheduled infusion before your dressing removal date, it is okay to let the staff remove your dressing and use your port.   You may remove chest dressing on __Thursday, July 11th___________ and leave the area uncovered.   You may remove your clear neck dressing 2 days after your procedure.   You may remove neck dressing on __7/8/24__________ and leave the area uncovered.   Do not remove the strips of tape on your chest or neck area. Let theses fall off on their own. Do not peel off any glue that might be over your incision.  The stitches used to close the skin around the port will dissolve on their own.   You may shower 3 days after your procedure. Continue to protect the dressing from direct water.   Do not submerge your port area in bath tub, swimming pool or hot tub until it is fully healed.   Once you remove a dressing, gently clean the area with soap and water and pat it dry with clean towel.   Check your port area every day for any signs of infection   Redness   Drainage  Increased warmth around the port site   Pus or foul odor   Fever or chills or increased pain at the port site  If you have any of  these signs or symptoms of infection: Please call and leave a message for our IR/Specials Nurse Practitioner Gina Jeb at [phone]. She will return your call the same business day. If unable to reach Gina, please call [phone] and ask to speak with a specials nurse. We are here M-F 7-4 PM.    Pain  After your procedure, when the numbing medicine wears off you might experience mild to moderate pain in the neck and chest area.   To help with pain you may apply ice pack 3 to 4 times a day for 20 minutes at a time to the chest and neck area. Be careful to not get the dressing area wet.      Medications for pain  Please refer to the medications on your discharge paperwork   If you are having pain, please take what you normally would to help relieve your pain.   If you have a prescription medication for chronic pain, continue taking your current regiment and that will help reduce or alleviate pain at the site of the incision as well.   Pain at the incision typically lasts 3-7 days and continues to lessen each day      Bleeding  If you have oozing from the port site that extends to the edges of the dressing. Hold pressure over the neck and chest area. If oozing does not stop in 5 minutes, call 911 or come straight to the emergency room to be evaluated.

## 2024-07-05 NOTE — PRE-SEDATION DOCUMENTATION
"Interventional Radiology Preprocedure Note    Krystina Wheatley   Indication for procedure: The encounter diagnosis was Crohn's disease of colon without complication (Multi). Here for port placement. Port will enable her do do home infusions for her Crohn's disease.     Relevant review of systems: NA      BP (!) 136/98   Pulse 89   Temp 36.8 °C (98.3 °F) (Temporal)   Resp 16   Ht 1.626 m (5' 4.02\")   Wt 81.6 kg (179 lb 14.3 oz)   LMP 06/14/2024 (Approximate) Comment: on oral contraceptives  SpO2 99%   BMI 30.86 kg/m²    Relevant Labs:   No results found for: \"CREATININE\", \"EGFR\", \"PTT\", \"INR\", \"PROTIME\", \"PREGTESTUR\"    Planned Sedation/Anesthesia: Moderate    Airway assessment: normal    Physical Exam  Constitutional:       Appearance: Normal appearance.   HENT:      Head: Normocephalic.      Mouth/Throat:      Mouth: Mucous membranes are moist.   Cardiovascular:      Rate and Rhythm: Normal rate and regular rhythm.      Pulses: Normal pulses.      Heart sounds: Normal heart sounds. No murmur heard.     No friction rub. No gallop.   Pulmonary:      Effort: Pulmonary effort is normal.      Breath sounds: Normal breath sounds.   Musculoskeletal:      Right lower leg: No edema.      Left lower leg: No edema.   Skin:     General: Skin is warm and dry.      Capillary Refill: Capillary refill takes less than 2 seconds.   Neurological:      General: No focal deficit present.      Mental Status: She is alert and oriented to person, place, and time.   Psychiatric:         Mood and Affect: Mood normal.         Behavior: Behavior normal.         Mallampati: II (hard and soft palate, upper portion of tonsils and uvula visible)    ASA Score: ASA 3 - Patient with moderate systemic disease with functional limitations    Benefits, risks and alternatives of procedure and planned sedation have been discussed with the patient and/or their representative. All questions answered and they agree to proceed.     Marlin Russ, " APRN-CNP

## 2024-07-08 DIAGNOSIS — N93.8 OTHER SPECIFIED ABNORMAL UTERINE AND VAGINAL BLEEDING: ICD-10-CM

## 2024-07-08 RX ORDER — DROSPIRENONE AND ETHINYL ESTRADIOL 0.03MG-3MG
1 KIT ORAL DAILY
Qty: 28 TABLET | Refills: 0 | Status: SHIPPED | OUTPATIENT
Start: 2024-07-08

## 2024-07-08 NOTE — TELEPHONE ENCOUNTER
Reviewing  EMR  Last Annual Exam: 07/20/2023  Ascus / Positive HPV 2023  Annual is scheduled 07/26/2024  1 month supply medication pended for Beti Ayon CNP

## 2024-07-09 ENCOUNTER — TELEPHONE (OUTPATIENT)
Dept: GASTROENTEROLOGY | Facility: CLINIC | Age: 29
End: 2024-07-09
Payer: COMMERCIAL

## 2024-07-09 NOTE — TELEPHONE ENCOUNTER
Patient called and said she was instructed to call the Office once her port for her Entyvio Infusions had been placed so new orders may be sent to the Infusion Center. Patient said it is placed and that orders for Entyvio can now be sent.      She also said that the Entecort does not seem to be working and wants to know if maybe the suppositories could be sent in to her Pharmacy as those have worked in the past?

## 2024-07-11 DIAGNOSIS — K51.90 ULCERATIVE COLITIS WITHOUT COMPLICATIONS, UNSPECIFIED LOCATION (MULTI): Primary | ICD-10-CM

## 2024-07-11 RX ORDER — EPINEPHRINE 0.3 MG/.3ML
0.3 INJECTION SUBCUTANEOUS EVERY 5 MIN PRN
OUTPATIENT
Start: 2024-07-11

## 2024-07-11 RX ORDER — ALBUTEROL SULFATE 0.83 MG/ML
3 SOLUTION RESPIRATORY (INHALATION) AS NEEDED
OUTPATIENT
Start: 2024-07-11

## 2024-07-11 RX ORDER — ACETAMINOPHEN 325 MG/1
650 TABLET ORAL ONCE
OUTPATIENT
Start: 2024-07-11

## 2024-07-11 RX ORDER — FAMOTIDINE 10 MG/ML
20 INJECTION INTRAVENOUS ONCE AS NEEDED
OUTPATIENT
Start: 2024-07-11

## 2024-07-11 RX ORDER — DIPHENHYDRAMINE HYDROCHLORIDE 50 MG/ML
50 INJECTION INTRAMUSCULAR; INTRAVENOUS AS NEEDED
OUTPATIENT
Start: 2024-07-11

## 2024-07-11 NOTE — TELEPHONE ENCOUNTER
I have renewed prescription for patient to resume Entyvio treatment.   The plans are that after initial repeat iv  Loading dose she will be switch to subcutaneous Entyvio for maintenance.

## 2024-07-16 ENCOUNTER — APPOINTMENT (OUTPATIENT)
Dept: VASCULAR SURGERY | Facility: CLINIC | Age: 29
End: 2024-07-16
Payer: COMMERCIAL

## 2024-07-17 ENCOUNTER — TELEPHONE (OUTPATIENT)
Dept: GASTROENTEROLOGY | Facility: CLINIC | Age: 29
End: 2024-07-17
Payer: COMMERCIAL

## 2024-07-17 DIAGNOSIS — K51.30 ULCERATIVE RECTOSIGMOIDITIS WITHOUT COMPLICATION (MULTI): Primary | ICD-10-CM

## 2024-07-17 RX ORDER — HYDROCORTISONE 100 MG/60ML
100 SUSPENSION RECTAL NIGHTLY
Qty: 30 EACH | Refills: 2 | Status: SHIPPED | OUTPATIENT
Start: 2024-07-17 | End: 2025-07-17

## 2024-07-17 NOTE — PROGRESS NOTES
The patient called the office and reported that she now has an appointment for start of Entyvio infusions.    I called and spoke with the patient regarding her request for Entocort suppositories.  We do not have that on her formulary.  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  She reported multiple small bowel movements at this time.  She is currently already taking budesonide 9 mg daily.    I sent a prescription for Cortenemas 60ml per rectum every night.  She is continue to take her budesonide 9 mg daily.  She is scheduled to an end of July.  The plan after the first 2 infusions and of Entyvio is to switch her over to Entyvio subcutaneously for maintenance of her colitis.    The patient has had great difficulties with IV access.  If subcutaneous Entyvio does not control her symptoms we may well be looking at oral  agents like Rinvoq versus Velsipity  Follow up Dr Us after completion of I.v. re-induction with Entyvio

## 2024-07-17 NOTE — TELEPHONE ENCOUNTER
Left message on machine to return call  - need financial information, # of people in household, and signatures on paperwork for juanis pt assistance.

## 2024-07-24 ENCOUNTER — SPECIALTY PHARMACY (OUTPATIENT)
Dept: PHARMACY | Facility: CLINIC | Age: 29
End: 2024-07-24

## 2024-07-24 ENCOUNTER — TELEPHONE (OUTPATIENT)
Dept: CARDIOLOGY | Facility: HOSPITAL | Age: 29
End: 2024-07-24
Payer: MEDICAID

## 2024-07-24 DIAGNOSIS — K50.10 CROHN'S DISEASE OF LARGE INTESTINE WITHOUT COMPLICATION (MULTI): Primary | ICD-10-CM

## 2024-07-24 RX ORDER — DIPHENHYDRAMINE HYDROCHLORIDE 50 MG/ML
50 INJECTION INTRAMUSCULAR; INTRAVENOUS AS NEEDED
OUTPATIENT
Start: 2024-07-31

## 2024-07-24 RX ORDER — HEPARIN SODIUM 1000 [USP'U]/ML
2000 INJECTION, SOLUTION INTRAVENOUS; SUBCUTANEOUS AS NEEDED
OUTPATIENT
Start: 2024-07-31

## 2024-07-24 RX ORDER — HEPARIN SODIUM,PORCINE/PF 10 UNIT/ML
50 SYRINGE (ML) INTRAVENOUS AS NEEDED
OUTPATIENT
Start: 2024-07-31

## 2024-07-24 RX ORDER — EPINEPHRINE 0.3 MG/.3ML
0.3 INJECTION SUBCUTANEOUS EVERY 5 MIN PRN
OUTPATIENT
Start: 2024-07-31

## 2024-07-24 RX ORDER — FAMOTIDINE 10 MG/ML
20 INJECTION INTRAVENOUS ONCE AS NEEDED
OUTPATIENT
Start: 2024-07-31

## 2024-07-24 RX ORDER — VEDOLIZUMAB 108 MG/.68ML
108 INJECTION, SOLUTION SUBCUTANEOUS
Qty: 1.36 ML | Refills: 11 | Status: SHIPPED | OUTPATIENT
Start: 2024-07-24

## 2024-07-24 RX ORDER — ALBUTEROL SULFATE 0.83 MG/ML
3 SOLUTION RESPIRATORY (INHALATION) AS NEEDED
OUTPATIENT
Start: 2024-07-31

## 2024-07-24 RX ORDER — HEPARIN 100 UNIT/ML
500 SYRINGE INTRAVENOUS AS NEEDED
OUTPATIENT
Start: 2024-07-31

## 2024-07-24 NOTE — PROGRESS NOTES
Staff requested that I enter orders for patient's Entyvio infusion that was not previously done by Dr. Rasheed.    Reviewed notes from Dr. Rasheed.    She has had difficult IV access so Dr. Rasheed has planned for CVC placement to allow for Entyvio induction dosing and then transition to subcutaneous Entyvio. In the past she required dose escalation of Entyvio to every 4 week dosing.     recommended dosing for SubQ is to receive at least two IV infusions and then start SubQ doses in place of next scheduled IV dose. After that SubQ dosing is every 2 weeks. Two IV infusion doses ordered to be done at week 0 and then at week 2. After that she should start SubQ Entyvio at week 6 and then continue SubQ Entyvio every 2 weeks.    Will plan to check Entyvio levels while on SubQ dosing to ensure that dose escalation is not needed again.    Unclear from documentation in chart what the status is for her SubQ Entyvio approval. Message sent to staff to check on SubQ approval.

## 2024-07-24 NOTE — PROGRESS NOTES
Informed by staff that Dr. Rasheed had not previously ordered the SubQ Entyvio. [facility] sent [facility]. She will be due for first dose of SubQ Entyvio on 9/11/2024. Staff will reach out the Entyvio drug rep for assistance obtaining approval. IV Entyvio is not a long term option for this patient due to her previous issues with reliable IV access.

## 2024-07-25 NOTE — TELEPHONE ENCOUNTER
I have completed the entyvio connect co-pay assist and coupon printed for pt to have when she picks up applications. Also new pt assistance packet and a entyvio connect (will help with insurance, prior auth issues, bridge programs) waiting for Dr. Suero to review / sign since the previous one that was waiting for her to  had Dr. Rasheed on it.   Yury Caballero response via email:    Fabio Aggarwal,     Thanks for reaching out.  Laura would be the best to reach out to help move everything through.  Her number is [phone].    As you wait for the Patient assistance info, make sure she is signed up for Entyvio Connect.  You can do this for her.  This will give Laura access to the patient's information if needed.  I have CC'd Laura on this to give her a heads up.    Call me directly at [phone] if you are not getting anywhere or have additional questions.  If I am in a meeting, leave a message and I will get right back to you when I'm out.     Yury Caballero  IBD Clinical  - [facility]  [employer]  [employer]  [address] [place] [address]  Mobile : [phone]  [email or web address]

## 2024-07-25 NOTE — TELEPHONE ENCOUNTER
Paperwork signed and waiting for pt at front window. Left message on machine to return call  for pt - reminded her that the paperwork is ready for her to complete her portion / attach financials so we can get it sent in.

## 2024-07-25 NOTE — TELEPHONE ENCOUNTER
Dr. Suero placed order for enytvio pen . I emailed Yury with [employer] about getting it fast tracked . Waiting on response from him.

## 2024-07-26 ENCOUNTER — APPOINTMENT (OUTPATIENT)
Dept: OBSTETRICS AND GYNECOLOGY | Facility: CLINIC | Age: 29
End: 2024-07-26
Payer: COMMERCIAL

## 2024-07-26 ENCOUNTER — HOSPITAL ENCOUNTER (OUTPATIENT)
Dept: CARDIOLOGY | Facility: HOSPITAL | Age: 29
Discharge: HOME | End: 2024-07-26
Payer: MEDICAID

## 2024-07-26 VITALS
BODY MASS INDEX: 28.99 KG/M2 | DIASTOLIC BLOOD PRESSURE: 70 MMHG | SYSTOLIC BLOOD PRESSURE: 110 MMHG | WEIGHT: 174 LBS | HEIGHT: 65 IN

## 2024-07-26 VITALS
TEMPERATURE: 97.4 F | WEIGHT: 173.94 LBS | HEART RATE: 75 BPM | DIASTOLIC BLOOD PRESSURE: 93 MMHG | RESPIRATION RATE: 16 BRPM | BODY MASS INDEX: 28.98 KG/M2 | HEIGHT: 65 IN | OXYGEN SATURATION: 96 % | SYSTOLIC BLOOD PRESSURE: 130 MMHG

## 2024-07-26 DIAGNOSIS — Z12.4 SCREENING FOR CERVICAL CANCER: ICD-10-CM

## 2024-07-26 DIAGNOSIS — K50.10 CROHN'S DISEASE OF COLON WITHOUT COMPLICATION (MULTI): ICD-10-CM

## 2024-07-26 DIAGNOSIS — Z45.2 ENCOUNTER FOR CARE RELATED TO PORT-A-CATH: Primary | ICD-10-CM

## 2024-07-26 DIAGNOSIS — Z11.51 SCREENING FOR HPV (HUMAN PAPILLOMAVIRUS): ICD-10-CM

## 2024-07-26 DIAGNOSIS — Z01.419 ENCOUNTER FOR WELL WOMAN EXAM WITH ROUTINE GYNECOLOGICAL EXAM: Primary | ICD-10-CM

## 2024-07-26 PROCEDURE — 2500000004 HC RX 250 GENERAL PHARMACY W/ HCPCS (ALT 636 FOR OP/ED): Performed by: NURSE PRACTITIONER

## 2024-07-26 PROCEDURE — 36598 INJ W/FLUOR EVAL CV DEVICE: CPT | Performed by: STUDENT IN AN ORGANIZED HEALTH CARE EDUCATION/TRAINING PROGRAM

## 2024-07-26 PROCEDURE — 2550000001 HC RX 255 CONTRASTS: Performed by: STUDENT IN AN ORGANIZED HEALTH CARE EDUCATION/TRAINING PROGRAM

## 2024-07-26 PROCEDURE — 1036F TOBACCO NON-USER: CPT | Performed by: NURSE PRACTITIONER

## 2024-07-26 PROCEDURE — 3008F BODY MASS INDEX DOCD: CPT | Performed by: NURSE PRACTITIONER

## 2024-07-26 PROCEDURE — 99395 PREV VISIT EST AGE 18-39: CPT | Performed by: NURSE PRACTITIONER

## 2024-07-26 PROCEDURE — 87624 HPV HI-RISK TYP POOLED RSLT: CPT

## 2024-07-26 RX ORDER — HEPARIN 100 UNIT/ML
500 SYRINGE INTRAVENOUS ONCE AS NEEDED
Status: COMPLETED | OUTPATIENT
Start: 2024-07-26 | End: 2024-07-26

## 2024-07-26 RX ORDER — IODIXANOL 270 MG/ML
10 INJECTION, SOLUTION INTRAVASCULAR
Status: COMPLETED | OUTPATIENT
Start: 2024-07-26 | End: 2024-07-26

## 2024-07-26 RX ORDER — HEPARIN SODIUM,PORCINE/PF 10 UNIT/ML
50 SYRINGE (ML) INTRAVENOUS EVERY 12 HOURS
Status: CANCELLED | OUTPATIENT
Start: 2024-07-26

## 2024-07-26 ASSESSMENT — PAIN - FUNCTIONAL ASSESSMENT: PAIN_FUNCTIONAL_ASSESSMENT: 0-10

## 2024-07-26 ASSESSMENT — PAIN SCALES - GENERAL: PAINLEVEL_OUTOF10: 0 - NO PAIN

## 2024-07-26 NOTE — PROGRESS NOTES
"     HPI:   Krystina Wheatley is a 28 y.o. who presents today for her annual gynecologic exam without complaints    She has the following concerns; None. No GYN concerns. She is doing well with her OCP. She is due for a PAP. Hx of ASCUS/ positive HPV, colposcopy showed CELENA-1   Hx of crohn's, had a recent port placed for infusions.   On Joslyn. Doing well with this pill. She is not sexually active. Plans to use condoms if she becomes active.   .   GYN HISTORY:  Periods are regular every 28-30 days, lasting 4 days.   Dysmenorrhea:none. Cyclic symptoms include  fatigue .   No intermenstrual bleeding, spotting, or discharge.    Current contraception: abstinence      Requests STD testing: no     PAP History   Last pap:   2023 ASCUS HPV Positive other  History of abnormal pap: yes - as above  HPV vaccine: yes -    @paphx@    Health Screening  Family history of breast, uterine, ovarian or colon cancer: yes - paternal grandmother has a hx of ovarian cancer.       Colon cancer: done in 2020.       The patient feels safe at home.         Review of Systems:   Constitutional: no fever and no chills.  Cardiovascular: no chest pain.   Respiratory: no shortness of breath.   Gastrointestinal: no nausea, no abdominal pain and no constipation  Genitourinary: no dysuria, no urinary incontinence, no vaginal dryness, no pelvic pain and no vaginal discharge.   Neurological: no headache.  Psychiatric: no anxiety and no depression.              Objective         /70   Ht 1.64 m (5' 4.57\")   Wt 78.9 kg (174 lb)   LMP 07/22/2024 (Exact Date) Comment: on oral contraceptives  BMI 29.34 kg/m²         Physical Exam:   Constitutional: Alert and in no acute distress. Well developed, well nourished.      Neck: No neck asymmetry. Supple. Thyroid not enlarged and there were no palpable thyroid nodules.      Cardiovascular: Heart rate and rhythm were normal, normal S1 and S2, no gallops, and no murmurs.      Pulmonary: No respiratory distress. " Clear bilateral breath sounds.      Chest: Breasts: Normal appearance, no nipple discharge and no skin changes. Palpation of breasts and axillae: No palpable mass and no axillary lymphadenopathy.      Abdomen: Soft nontender; no abdominal mass palpated. Normal bowel sounds. No organomegaly.      Genitourinary:   - External genitalia: Normal.   - Palpation of lymph nodes in groin: No inguinal lymphadenopathy.   - Bartholin's Urethral and Skenes Glands: Normal.   - Urethra: Normal.    -Bladder: Normal on palpation.   - Vagina: Normal.   - Cervix: Normal.   - Uterus: Normal. Right Adnexa/parametria: Normal. Left Adnexa/parametria: Normal.   - Perianal Area: Normal.      Skin: Normal skin color and pigmentation, normal skin turgor, and no rash     Psychiatric: Alert and oriented x 3. Affect normal to patient baseline. Mood: Appropriate.            Assessment/Plan       Diagnoses and all orders for this visit:  Encounter for well woman exam with routine gynecological exam  Krystina is a [facility] patient who presents for well woman exam. She is doing well, though having a Crohn's flare-up. Had a recent Mediport placed. Discussed changing from an oral ocp to a patch for better absorption. She does not want to change her pill at this time. Has been on it for awhile and feels it is a good fit for her. If she becomes sexually active, she will plan to use condoms. PAP obtained today.   Screening for HPV (human papillomavirus)  -     THINPREP PAP  Screening for cervical cancer  -     THINPREP PAP  Follow-up annually; sooner if needed or pending results of PAP testing.       MATILDA Gardner-CNP

## 2024-07-26 NOTE — SIGNIFICANT EVENT
Patient states site was healed and bruising reslolved after insertion but a dark bruise appeared 4 days ago above the port. She does not recall bumping it with anything.  Port accesssed for port check,  unable to obtain blood return

## 2024-07-26 NOTE — DISCHARGE INSTRUCTIONS
"If you have any questions, please call the Interventional Radiology Nurse Practitioner Gina Russ at [phone] and leave a message. She will return your call the same day if calling before 3 PM M-F. If you call on the weekend you can expect a call back on Monday morning. You may also call the Cath Lab at [phone] M-F, 7-3:30 with any questions. Weekends and after hours please call your referring provider's office number to reach a physician on call.    How to care for a port    The Basics  Written by the doctors and editors at [facility]  What is a port? -- A port is a type of central line, which is a special kind of IV. Sample brand names for ports include Port-a-Cath, BardPort, PowerPort, Egkuja-y-Owln, and Mediport. The medical term for a port is a \"totally implanted venous access device.\"  Ports have a small, hollow \"reservoir\" that is attached to a thin, flexible tube called a \"catheter\" (figure 1). Both the reservoir and the catheter are under the skin. The catheter goes into a large blood vessel that leads to the heart. The reservoir is a round disc that is covered with a special silicone. The silicone allows the port to be used over and over.  The medical staff can \"access\" the port using a special type of needle. The needle goes through your skin and into the reservoir (figure 2). The needle can stay in for a week at a time and will be covered with a clear bandage and taped in place. When the port is not being accessed, all you will see is a small bump under your skin.  A port can stay in place for months or years until your treatment is no longer needed.  How do I care for myself at home? -- Ask the doctor or nurse what you should do when you go home. Make sure that you understand exactly what you need to do to care for yourself. Ask questions if there is anything you do not understand.  You should also:  ? Know what kind of port you have and how to care for it. Before you go home, your doctor or " "nurse will talk to you about this. They will teach you how to change the dressing, flush the port, \"access\" the port (put a needle in), and \"de-access\" the port (take a needle out). They will also make sure that you have the supplies you need. Having a port increases your risk of infection. This is why it's so important to take care of it.  ? Ask a family member or friend to help you care for the port, if needed. Some people have a home health nurse come to their home to help with this.  Will the port always be accessed? -- When you are at home, there might be times when the port is still accessed. This means that it still has a needle in place, for example, to give medicine.  When the port is accessed:  ? Do not get the port wet while the needle is in place. Take a sponge bath or keep the dressing covered when showering.  ? Keep the dressing clean and dry. This helps keep the needle secure and in place.  When the port is not accessed:  ? You can shower, take a bath, or swim in a pool.  ? You do not need to have a dressing over the port. It will look like a small bump under your skin.  ? Wear clothes that do not rub on the port.  ? You can do many of your normal activities. Talk to your doctor about what activities are safe for you.  ? Flush the port before and after giving medicines and blood draws. Some doctors want you to flush the port at least 1 time each month. Other doctors might want you to flush the port at least every 3 to 4 months. Talk to your doctor about how often you need to do this.  How do I change the dressing on the port? -- When the port is accessed, it will have a clear dressing to hold the needle in place. Always change the dressing right away if it is wet, loose, or dirty. This is to help prevent infections.  To change the dressing:  ? Gather the supplies, and place them on a clean workspace. Your supplies might come in a kit. You need clean gloves, sterile gloves, masks, chlorhexidine swab " sticks or wipes, tape, and sterile dressings. Your doctor might also give you skin preparation wipes.  ? Always wash your hands well with soap and water before touching the needle and tubing. This will help avoid spreading germs. Everyone should wear a mask during the dressing change, including the person with the port. Another option is to have the person with the port keep their head turned away while the dressing is being changed. Wear clean gloves when taking off the old dressing.  ? Take off the old dressing. Do not use scissors or sharp tools. They could cut the extension tubing that is connected to the needle. Do not pull on the tubing when taking off the dressing.  ? Check the skin where the needle enters for swelling, drainage, or redness.  ? Wash your hands again. Put on sterile gloves.  ? Clean the skin with a chlorhexidine swab or wipe using a back-and-forth scrubbing motion for 30 seconds. Start where the needle enters the skin, and clean away from this point. Clean the outside of the tubing and the entire area where the new dressing will go, about a 4-inch (10 cm) square. Let the cleaned area air dry for at least 30 seconds or until completely dry. Do not fan, wave, or blow on the skin to help it dry faster.  ? Your doctor might give you a small sponge to go around the needle where it goes into the port. Put this sponge on the skin so it circles the needle. Make sure that it is right-side up as marked on the sponge. In some cases, the sponge is a part of the dressing.  ? You might have been given skin preparation wipes to use. If so, once the cleaned area is dry, use the wipe around the port where the new dressing will go. Let the skin dry completely before putting on the dressing. This protects the skin by helping prevent irritation from changing the dressing. It also helps the dressing stick.  ? Lay the tubing on the skin in a loose [place] if it is long, or in a straight line if it is short. It  should not lie on top of where the port is under the skin or lie on itself. Put the dressing on all sides of the tubing. Make sure that you cover where the needle goes into the skin. The dressing should be snug but not stretched or it will pull on the skin. Most dressings are clear and will stick to the skin. If it is not sticking, put medical tape on the edges only to hold it in place. If you often have trouble with the dressing not sticking to your skin, talk to your doctor or nurse about the dressing you use or the way you are changing your dressing. They will help you get the dressing to stick better.  ? Throw away the old dressing, mask, and used swabs and wipes.  ? Remove the gloves.  ? Wash your hands with soap and water.  How do I access and flush the port? -- The port should be flushed before and after each use with a sterile fluid. Also, be sure to follow your doctor's instructions about flushing the port when it is not being used. This helps keep the port and catheter from getting blocked.  To flush the port, you need to access it. Your doctor will tell you how long you can have the port accessed before you need to change the needle. You might be able to leave an access needle in place for up to a week at a time.  To access and flush the port:  ? Gather the supplies, and place them on a clean workspace. You will need:  ? Clean gloves  ? A mask for the person who is accessing the port, and a mask for the person with the port  ? Chlorhexidine swab sticks  ? Alcohol wipes  ? A special needle to access the port connected to tubing - This needle is bent in the middle and connected to a short piece of clear tubing.  ? 10-mL syringes filled with the fluid to flush the catheter - Your doctor might order heparin, normal saline, or a special flush for you to use. Your doctor might have you use a smaller amount of fluid to flush the line if you are caring for a child with a port.  ? Injection cap  ? Numbing cream,  if your doctor gave you this.  ? Wash your hands with soap and water.  ? Everyone should wear a mask when accessing the port. This includes the person with the port. Another option is to have the person with the port keep their head turned away.  ? Put on clean gloves, and take off the old dressing if there is one. Throw it away.  ? Wash your hands with soap and water.  ? If your doctor gave you a numbing cream, patch, or spray, put it on the skin over the port. Follow the instructions for how long to wait for it to work. Then, remove the cream or patch, and wipe the skin with clean gauze.  ? Put on sterile gloves. Clean the skin over the port with a chlorhexidine swab using a back-and-forth scrubbing motion for 30 seconds. Let the cleaned area air dry for at least 30 seconds or until completely dry. Do not fan, wave, or blow on the skin to help it dry faster.  ? Connect the injection cap to the tubing. Fill the injection cap, tubing, and needle with flush solution. Clamp the tubing, and leave the syringe attached to the tubing.  ? Hold the edges of the port in place with your thumb and index finger of the hand that you do not write with. Do not touch the skin where you will insert the needle, because it is clean.  ? Insert the needle at a 90° angle from the skin in the center of the port (figure 2). Push the needle in firmly until it touches the back of the port. Do not push more once you reach the back of the port.  ? Unclamp the tubing, and pull back on the syringe to check for a blood return. You do not need to fill the syringe with blood. Pull back just enough to check that the blood is there.  ? If there is a blood return, flush slowly and steadily until the syringe is almost empty. Clamp the tubing before you get to the very end of the flush solution, when there is less than 1 mL in the syringe.  ? If there is not a blood return, reinsert the needle and try again. If there is still no blood return, start over  "with a new needle. If you cannot get a blood return with a new needle, call your doctor.  ? If you are leaving the needle in place, remove the syringe. Cover the needle and tubing with a clear dressing.  ? If you are only flushing the port, remove the needle. Apply gentle pressure with a clean gauze to stop any bleeding.  ? Throw away any used swabs, wipes, or other supplies. Throw away the needle and syringe in a hard plastic container (picture 1).  ? Wash your hands with soap and water.  How do I flush the port and de-access it? -- When you do not need to have any medicines or fluids going into the port, the needle can be removed. This is called \"de-accessing\" the port. Flush the port before you do this.  To flush and de-access the port:  ? Gather the supplies, and place them on a clean workspace. You will need:  ? A mask for the person who is accessing the port, and a mask for the person with the port  ? Clean gloves  ? Alcohol wipe  ? 10-mL syringes filled with the fluid to flush the catheter. Your doctor might order heparin, normal saline, or a special flush solution for you to use. Your doctor might have you use a smaller amount of fluid to flush the line if you are caring for a child with a port.  ? Wash your hands with soap and water.  ? Wearing clean gloves, scrub the injection cap with an alcohol wipe for 15 seconds, and let dry. Do not fan, wave, or blow on the cap to help it dry faster.  ? Attach the flush syringe to the injection cap. Unclamp the tubing, and pull back on the syringe to check for a blood return.  ? If there is a blood return, flush slowly and steadily until the syringe is almost empty. Clamp the tubing before you get to the very end of the flush solution, when there is less than 1 mL in the syringe. Remove the syringe.  ? If there is not a blood return, reposition the needle and try again. If there is still no blood return, start over with a new needle. If you are not able to get a blood " return with a new needle, call your doctor.  ? After flushing the port, put on the mask. Also, have the person with the port wear a mask or keep their head turned away while the port is de-accessed.  ? Remove the clear dressing.  ? Hold the port steady with your thumb and index fingers of 1 hand. With your other hand, remove the port needle by pulling straight up. Be careful not to stick your fingers or skin with the needle. Most needles have a safety catch or cover. Your nurse will show you how to cover the needle when it is out.  ? Throw away the needle and syringe in a hard plastic container (picture 1).  ? Apply gentle pressure with a clean gauze to stop any bleeding.  ? Throw away your mask, used swabs, wipes, and other materials.  ? Wash your hands with soap and water.  What else should I know? -- Some tips for while you have a port:  ? Shoulder seatbelts can rub on the port site. Fold a washcloth, and place it between the seatbelt and the port.  ? Avoid contact sports or rough play.  ? If the port is accessed, keep the dressing covered when showering. Change the dressing if it gets wet or dirty, or becomes loose.  ? Check the skin over the port each day for signs of infection.  When should I call the doctor? -- Call for advice if:  ? You have symptoms of infection. These include a fever of 100.4°F (38°C) or higher, chills, or redness, drainage, warmth, stinging, or pain where the port needle goes into your skin.  ? You have problems with the port site:  ? The site starts bleeding and does not stop with gentle pressure.  ? The site becomes more red, or the skin over or around the port breaks open.  ? You develop a rash, bumps, itching, or irritation where the dressing goes onto the skin.  ? There is drainage or pus from the port site.  ? You have problems with the port:  ? The port seems to have moved under your skin.  ? You are not able to get the needle into the port.  ? You have pain, irritation, or  swelling around the port site when you flush the port.  ? Your arm is swollen.  ? You are not able to get the medicines or flush solution through the port.  ? You are not able to get a blood return from the port.  ? You have any concerns about the port.  ? You have sudden shortness of breath or chest pain.  ? You hear a swishing sound in 1 of your ears when you flush the port.  ? You notice changes in your face, jaw, neck, chest, or upper arm, like:  ? Swelling  ? Change in skin color  ? Veins are swelling or sticking out more  All topics are updated as new evidence becomes available and our peer review process is complete.

## 2024-07-26 NOTE — PRE-SEDATION DOCUMENTATION
"Interventional Radiology Preprocedure Note    Krystina Wheatley   Indication for procedure: Diagnoses of Encounter for care related to Port-a-Cath and Crohn's disease of colon without complication (Multi) were pertinent to this visit. Patient here for port check after bruising occurred this week. Patient denies any injury to site. She woke up from sleep and noted in a few days ago. The port has not been accessed since insertion. She has an upcoming infusion and would like it checked.     Relevant review of systems: NA      BP (!) 130/93   Pulse 75   Temp 36.3 °C (97.4 °F) (Temporal)   Resp 16   Ht 1.64 m (5' 4.57\")   Wt 78.9 kg (173 lb 15.1 oz)   LMP 07/22/2024 (Exact Date) Comment: on oral contraceptives  SpO2 96%   BMI 29.34 kg/m²    Relevant Labs:   No results found for: \"CREATININE\", \"EGFR\", \"PTT\", \"INR\", \"PROTIME\", \"PREGTESTUR\"    Planned Sedation/Anesthesia: none     Airway assessment: normal    Physical Exam  Vitals reviewed.   Constitutional:       Appearance: Normal appearance.   HENT:      Head: Normocephalic.   Eyes:      General: No scleral icterus.     Extraocular Movements: Extraocular movements intact.   Cardiovascular:      Rate and Rhythm: Normal rate.   Pulmonary:      Effort: Pulmonary effort is normal.   Skin:     General: Skin is warm and dry.      Capillary Refill: Capillary refill takes less than 2 seconds.      Findings: Bruising (over right sided port and right chest -in line with catheter tunneled area) present.   Neurological:      General: No focal deficit present.      Mental Status: She is alert and oriented to person, place, and time.   Psychiatric:         Mood and Affect: Mood normal.         Behavior: Behavior normal.         Thought Content: Thought content normal.         Judgment: Judgment normal.         Mallampati:  NA    ASA Score: ASA 1 - Normal health patient    Benefits, risks and alternatives of procedure and planned sedation have been discussed with the patient " and/or their representative. All questions answered and they agree to proceed.     Marlin Russ, APRN-CNP

## 2024-07-26 NOTE — POST-PROCEDURE NOTE
Interventional Radiology Brief Postprocedure Note    Attending: Boston Young    Diagnosis: Indwelling port    Description of procedure: Port catheter check     Anesthesia:  None    Complications: None    Estimated Blood Loss: none    Medications (Filter: Administrations occurring from 1054 to 1110 on 07/26/24) As of 07/26/24 1110      heparin flush 100 unit/mL syringe 500 Units (Units) Total dose:  500 Units Dosing weight:  78.9      Date/Time Rate/Dose/Volume Action       07/26/24  1108 500 Units Given                   No specimens collected      See detailed result report with images in PACS. Port flushes forward but does not aspirate secondary to a fibrin sheath, okay for use.    The patient tolerated the procedure well without incident or complication and is in stable condition.

## 2024-07-26 NOTE — PRE-PROCEDURE NOTE
"Interventional Radiology Preprocedure Note    Indication for procedure: The primary encounter diagnosis was Encounter for care related to Port-a-Cath. A diagnosis of Crohn's disease of colon without complication (Multi) was also pertinent to this visit.    Relevant review of systems: NA    Relevant Labs:   No results found for: \"CREATININE\", \"EGFR\", \"PTT\", \"INR\", \"PROTIME\", \"PREGTESTUR\"    Planned Sedation/Anesthesia: None    Airway assessment: normal    Directed physical examination:    GENERAL: awake, no acute distress  HEENT: AT/NC  NECK: supple  CV: RRR  PULM: non-labored breathing  ABDOMEN: soft, ND  NEURO: AAOx3  MSK: full ROM  SKIN: bruising superior to the port over the right upper chest    Mallampati: II (hard and soft palate, upper portion of tonsils and uvula visible)    ASA Score: ASA 2 - Patient with mild systemic disease with no functional limitations    Benefits, risks and alternatives of procedure and planned sedation have been discussed with the patient and/or their representative. All questions answered and they agree to proceed.   "

## 2024-07-26 NOTE — PROGRESS NOTES
Krystina Cerratopadminidavid is a 28 year old women s/p port placement in early July. She describes that after initial bruising healed to neck and chest she had recurrent bruising of the neck. She has upcoming infusion and is concerned that the port is not properly working. She would like port checked. We will have patient come to IR and access port and observe area of concern. If no issues access and blood return we will have patient return home. If no blood return we will check port with contrast under fluoroscopy.

## 2024-07-27 NOTE — ASSESSMENT & PLAN NOTE
Krystina is a [facility] patient who presents for well woman exam. She is doing well, though having a Crohn's flare-up. Had a recent Mediport placed. Discussed changing from an oral ocp to a patch for better absorption. She does not want to change her pill at this time. Has been on it for awhile and feels it is a good fit for her. If she becomes sexually active, she will plan to use condoms. PAP obtained today.

## 2024-07-31 ENCOUNTER — INFUSION (OUTPATIENT)
Dept: INFUSION THERAPY | Facility: HOSPITAL | Age: 29
End: 2024-07-31
Payer: MEDICAID

## 2024-07-31 VITALS
RESPIRATION RATE: 16 BRPM | SYSTOLIC BLOOD PRESSURE: 133 MMHG | BODY MASS INDEX: 30.26 KG/M2 | TEMPERATURE: 97.8 F | DIASTOLIC BLOOD PRESSURE: 89 MMHG | OXYGEN SATURATION: 100 % | WEIGHT: 179.45 LBS | HEART RATE: 92 BPM

## 2024-07-31 DIAGNOSIS — K50.10 CROHN'S DISEASE OF LARGE INTESTINE WITHOUT COMPLICATION (MULTI): ICD-10-CM

## 2024-07-31 PROCEDURE — 2500000004 HC RX 250 GENERAL PHARMACY W/ HCPCS (ALT 636 FOR OP/ED): Mod: JZ | Performed by: INTERNAL MEDICINE

## 2024-07-31 PROCEDURE — 96365 THER/PROPH/DIAG IV INF INIT: CPT | Mod: INF

## 2024-07-31 RX ORDER — ALBUTEROL SULFATE 0.83 MG/ML
3 SOLUTION RESPIRATORY (INHALATION) AS NEEDED
OUTPATIENT
Start: 2024-08-14

## 2024-07-31 RX ORDER — HEPARIN SODIUM,PORCINE/PF 10 UNIT/ML
50 SYRINGE (ML) INTRAVENOUS AS NEEDED
OUTPATIENT
Start: 2024-07-31

## 2024-07-31 RX ORDER — DIPHENHYDRAMINE HYDROCHLORIDE 50 MG/ML
50 INJECTION INTRAMUSCULAR; INTRAVENOUS AS NEEDED
OUTPATIENT
Start: 2024-08-14

## 2024-07-31 RX ORDER — EPINEPHRINE 0.3 MG/.3ML
0.3 INJECTION SUBCUTANEOUS EVERY 5 MIN PRN
OUTPATIENT
Start: 2024-08-14

## 2024-07-31 RX ORDER — HEPARIN 100 UNIT/ML
500 SYRINGE INTRAVENOUS AS NEEDED
OUTPATIENT
Start: 2024-07-31

## 2024-07-31 RX ORDER — HEPARIN 100 UNIT/ML
500 SYRINGE INTRAVENOUS AS NEEDED
Status: DISCONTINUED | OUTPATIENT
Start: 2024-07-31 | End: 2024-07-31 | Stop reason: HOSPADM

## 2024-07-31 RX ORDER — FAMOTIDINE 10 MG/ML
20 INJECTION INTRAVENOUS ONCE AS NEEDED
OUTPATIENT
Start: 2024-08-14

## 2024-07-31 RX ORDER — HEPARIN SODIUM 1000 [USP'U]/ML
2000 INJECTION, SOLUTION INTRAVENOUS; SUBCUTANEOUS AS NEEDED
OUTPATIENT
Start: 2024-07-31

## 2024-07-31 ASSESSMENT — COLUMBIA-SUICIDE SEVERITY RATING SCALE - C-SSRS
6. HAVE YOU EVER DONE ANYTHING, STARTED TO DO ANYTHING, OR PREPARED TO DO ANYTHING TO END YOUR LIFE?: NO
1. IN THE PAST MONTH, HAVE YOU WISHED YOU WERE DEAD OR WISHED YOU COULD GO TO SLEEP AND NOT WAKE UP?: NO
2. HAVE YOU ACTUALLY HAD ANY THOUGHTS OF KILLING YOURSELF?: NO

## 2024-07-31 ASSESSMENT — PATIENT HEALTH QUESTIONNAIRE - PHQ9
2. FEELING DOWN, DEPRESSED OR HOPELESS: NOT AT ALL
1. LITTLE INTEREST OR PLEASURE IN DOING THINGS: NOT AT ALL
SUM OF ALL RESPONSES TO PHQ9 QUESTIONS 1 AND 2: 0

## 2024-07-31 ASSESSMENT — ENCOUNTER SYMPTOMS
DEPRESSION: 0
OCCASIONAL FEELINGS OF UNSTEADINESS: 0
LOSS OF SENSATION IN FEET: 0

## 2024-07-31 ASSESSMENT — PAIN SCALES - GENERAL: PAINLEVEL: 0-NO PAIN

## 2024-08-06 LAB
CYTOLOGY CMNT CVX/VAG CYTO-IMP: NORMAL
HPV HR 12 DNA GENITAL QL NAA+PROBE: POSITIVE
HPV HR GENOTYPES PNL CVX NAA+PROBE: POSITIVE
HPV16 DNA SPEC QL NAA+PROBE: NEGATIVE
HPV18 DNA SPEC QL NAA+PROBE: NEGATIVE
LAB AP HPV GENOTYPE QUESTION: YES
LAB AP HPV HR: NORMAL
LAB AP PREVIOUS ABNORMAL HISTORY: NORMAL
LABORATORY COMMENT REPORT: NORMAL
LMP START DATE: NORMAL
PATH REPORT.TOTAL CANCER: NORMAL

## 2024-08-14 ENCOUNTER — INFUSION (OUTPATIENT)
Dept: INFUSION THERAPY | Facility: HOSPITAL | Age: 29
End: 2024-08-14
Payer: MEDICAID

## 2024-08-14 VITALS
SYSTOLIC BLOOD PRESSURE: 142 MMHG | BODY MASS INDEX: 29.6 KG/M2 | WEIGHT: 175.49 LBS | TEMPERATURE: 97.9 F | DIASTOLIC BLOOD PRESSURE: 94 MMHG | RESPIRATION RATE: 16 BRPM | OXYGEN SATURATION: 98 % | HEART RATE: 76 BPM

## 2024-08-14 DIAGNOSIS — K50.10 CROHN'S DISEASE OF LARGE INTESTINE WITHOUT COMPLICATION (MULTI): ICD-10-CM

## 2024-08-14 PROCEDURE — 2500000004 HC RX 250 GENERAL PHARMACY W/ HCPCS (ALT 636 FOR OP/ED): Performed by: INTERNAL MEDICINE

## 2024-08-14 PROCEDURE — 96365 THER/PROPH/DIAG IV INF INIT: CPT | Mod: INF

## 2024-08-14 RX ORDER — HEPARIN SODIUM,PORCINE/PF 10 UNIT/ML
50 SYRINGE (ML) INTRAVENOUS AS NEEDED
OUTPATIENT
Start: 2024-08-14

## 2024-08-14 RX ORDER — HEPARIN SODIUM 1000 [USP'U]/ML
2000 INJECTION, SOLUTION INTRAVENOUS; SUBCUTANEOUS AS NEEDED
OUTPATIENT
Start: 2024-08-14

## 2024-08-14 RX ORDER — FAMOTIDINE 10 MG/ML
20 INJECTION INTRAVENOUS ONCE AS NEEDED
OUTPATIENT
Start: 2024-08-28

## 2024-08-14 RX ORDER — ALBUTEROL SULFATE 0.83 MG/ML
3 SOLUTION RESPIRATORY (INHALATION) AS NEEDED
OUTPATIENT
Start: 2024-08-28

## 2024-08-14 RX ORDER — EPINEPHRINE 0.3 MG/.3ML
0.3 INJECTION SUBCUTANEOUS EVERY 5 MIN PRN
OUTPATIENT
Start: 2024-08-28

## 2024-08-14 RX ORDER — DIPHENHYDRAMINE HYDROCHLORIDE 50 MG/ML
50 INJECTION INTRAMUSCULAR; INTRAVENOUS AS NEEDED
OUTPATIENT
Start: 2024-08-28

## 2024-08-14 RX ORDER — HEPARIN 100 UNIT/ML
500 SYRINGE INTRAVENOUS AS NEEDED
Status: DISCONTINUED | OUTPATIENT
Start: 2024-08-14 | End: 2024-08-14 | Stop reason: HOSPADM

## 2024-08-14 RX ORDER — HEPARIN 100 UNIT/ML
500 SYRINGE INTRAVENOUS AS NEEDED
OUTPATIENT
Start: 2024-08-14

## 2024-08-14 ASSESSMENT — ENCOUNTER SYMPTOMS
LOSS OF SENSATION IN FEET: 0
DEPRESSION: 0
OCCASIONAL FEELINGS OF UNSTEADINESS: 0

## 2024-08-14 ASSESSMENT — PATIENT HEALTH QUESTIONNAIRE - PHQ9
1. LITTLE INTEREST OR PLEASURE IN DOING THINGS: NOT AT ALL
2. FEELING DOWN, DEPRESSED OR HOPELESS: NOT AT ALL
SUM OF ALL RESPONSES TO PHQ9 QUESTIONS 1 AND 2: 0

## 2024-08-14 ASSESSMENT — COLUMBIA-SUICIDE SEVERITY RATING SCALE - C-SSRS
6. HAVE YOU EVER DONE ANYTHING, STARTED TO DO ANYTHING, OR PREPARED TO DO ANYTHING TO END YOUR LIFE?: NO
2. HAVE YOU ACTUALLY HAD ANY THOUGHTS OF KILLING YOURSELF?: NO
1. IN THE PAST MONTH, HAVE YOU WISHED YOU WERE DEAD OR WISHED YOU COULD GO TO SLEEP AND NOT WAKE UP?: NO

## 2024-08-14 ASSESSMENT — PAIN SCALES - GENERAL: PAINLEVEL: 0-NO PAIN

## 2024-09-03 ENCOUNTER — TELEPHONE (OUTPATIENT)
Dept: OBSTETRICS AND GYNECOLOGY | Facility: CLINIC | Age: 29
End: 2024-09-03
Payer: MEDICAID

## 2024-09-03 NOTE — TELEPHONE ENCOUNTER
Patient had yearly in August but refills of birth control shirley were not called in she would also like  refills on the clotrizole betamethasone 1%/0.05% cream sent to [facility] in [place]

## 2024-09-05 DIAGNOSIS — N89.8 VAGINAL IRRITATION: Primary | ICD-10-CM

## 2024-09-05 DIAGNOSIS — N93.8 OTHER SPECIFIED ABNORMAL UTERINE AND VAGINAL BLEEDING: ICD-10-CM

## 2024-09-05 RX ORDER — DROSPIRENONE AND ETHINYL ESTRADIOL 0.03MG-3MG
1 KIT ORAL DAILY
Qty: 28 TABLET | Refills: 12 | Status: SHIPPED | OUTPATIENT
Start: 2024-09-05

## 2024-09-05 RX ORDER — CLOTRIMAZOLE AND BETAMETHASONE DIPROPIONATE 10; .64 MG/G; MG/G
1 CREAM TOPICAL 2 TIMES DAILY
Qty: 15 G | Refills: 1 | Status: SHIPPED | OUTPATIENT
Start: 2024-09-05 | End: 2024-10-03

## 2024-09-06 ENCOUNTER — TELEPHONE (OUTPATIENT)
Dept: GASTROENTEROLOGY | Facility: HOSPITAL | Age: 29
End: 2024-09-06
Payer: MEDICAID

## 2024-09-06 DIAGNOSIS — K50.10 CROHN'S DISEASE OF LARGE INTESTINE WITHOUT COMPLICATION (MULTI): Primary | ICD-10-CM

## 2024-09-09 RX ORDER — DIPHENHYDRAMINE HYDROCHLORIDE 50 MG/ML
50 INJECTION INTRAMUSCULAR; INTRAVENOUS AS NEEDED
Status: CANCELLED | OUTPATIENT
Start: 2024-09-11

## 2024-09-09 RX ORDER — EPINEPHRINE 0.3 MG/.3ML
0.3 INJECTION SUBCUTANEOUS EVERY 5 MIN PRN
Status: CANCELLED | OUTPATIENT
Start: 2024-09-11

## 2024-09-09 RX ORDER — FAMOTIDINE 10 MG/ML
20 INJECTION INTRAVENOUS ONCE AS NEEDED
Status: CANCELLED | OUTPATIENT
Start: 2024-09-11

## 2024-09-09 RX ORDER — ALBUTEROL SULFATE 0.83 MG/ML
3 SOLUTION RESPIRATORY (INHALATION) AS NEEDED
Status: CANCELLED | OUTPATIENT
Start: 2024-09-11

## 2024-09-09 NOTE — TELEPHONE ENCOUNTER
Patient of Dr. Rasheed.    He had previously ordered port placement and then ordered Entyvio infusions with the hopes that the patient would then transition to SubQ Entyvio. SubQ Entyvio has unfortunately been denied by her insurance.    She received the first two Entyvio infusions, but no further infusion orders were entered by Dr. Rasheed. She is due for her next infusion (last loading dose) on 9/11 and then will be due for her first maintenance infusion 8 weeks after that.    I have entered a therapy plan with one loading dose to be done on 9/11 and then I have also ordered her maintenance infusions.    Can attempt to have SubQ Entyvio approved in the future, but in the interim she should continue scheduled infusions.

## 2024-09-09 NOTE — TELEPHONE ENCOUNTER
Patient called and I informed her of treatment plan below.  She said she has always been on Entyvio every 4 weeks and is asking to stay on that schedule.

## 2024-09-09 NOTE — TELEPHONE ENCOUNTER
*Called and spoke with Amina STAPLETON at [facility] for entyvio pt assistance. Pt was approved for the copay assist program but denied for pt assistance due to have active insurance coverage - if this were to change they would do a new benefits investigation and go from there.     *[facility] and [facility] and she stated the PA for entyvio pens was denied 8/26 by the insurance and pt has to stick with the infusions unless we wanted to do an appeal. I did not see the denial in pt chart and requested it be resent to us.     *Called  [facility] and Per Cyn, pt will need a new therapy plan and they will get her scheduled.

## 2024-09-10 NOTE — TELEPHONE ENCOUNTER
Left message on machine to return call  for pt -  pt needs notified that PA for entyvio pen was denied but insurance approved entyvio infusions. New plan added to chart.

## 2024-09-10 NOTE — TELEPHONE ENCOUNTER
Pt notified. She asked if she was getting bumped back to home care for infusions and I advised her that I had not heard anything about that yet. I told her to call the office back if there were any further issues.

## 2024-09-11 ENCOUNTER — APPOINTMENT (OUTPATIENT)
Dept: INFUSION THERAPY | Facility: HOSPITAL | Age: 29
End: 2024-09-11
Payer: MEDICAID

## 2024-09-11 ENCOUNTER — APPOINTMENT (OUTPATIENT)
Dept: INFUSION THERAPY | Facility: HOSPITAL | Age: 29
End: 2024-09-11
Payer: COMMERCIAL

## 2024-09-11 ENCOUNTER — INFUSION (OUTPATIENT)
Dept: INFUSION THERAPY | Facility: HOSPITAL | Age: 29
End: 2024-09-11
Payer: MEDICAID

## 2024-09-11 VITALS
RESPIRATION RATE: 16 BRPM | WEIGHT: 173.06 LBS | HEART RATE: 85 BPM | TEMPERATURE: 98.4 F | DIASTOLIC BLOOD PRESSURE: 80 MMHG | BODY MASS INDEX: 29.19 KG/M2 | OXYGEN SATURATION: 99 % | SYSTOLIC BLOOD PRESSURE: 121 MMHG

## 2024-09-11 DIAGNOSIS — K50.10 CROHN'S DISEASE OF LARGE INTESTINE WITHOUT COMPLICATION (MULTI): ICD-10-CM

## 2024-09-11 PROCEDURE — 2500000004 HC RX 250 GENERAL PHARMACY W/ HCPCS (ALT 636 FOR OP/ED): Mod: JZ | Performed by: INTERNAL MEDICINE

## 2024-09-11 PROCEDURE — 96365 THER/PROPH/DIAG IV INF INIT: CPT | Mod: INF

## 2024-09-11 RX ORDER — HEPARIN 100 UNIT/ML
500 SYRINGE INTRAVENOUS AS NEEDED
Status: DISCONTINUED | OUTPATIENT
Start: 2024-09-11 | End: 2024-09-11 | Stop reason: HOSPADM

## 2024-09-11 RX ORDER — ALBUTEROL SULFATE 0.83 MG/ML
3 SOLUTION RESPIRATORY (INHALATION) AS NEEDED
OUTPATIENT
Start: 2024-09-25

## 2024-09-11 RX ORDER — EPINEPHRINE 0.3 MG/.3ML
0.3 INJECTION SUBCUTANEOUS EVERY 5 MIN PRN
OUTPATIENT
Start: 2024-09-25

## 2024-09-11 RX ORDER — HEPARIN 100 UNIT/ML
500 SYRINGE INTRAVENOUS AS NEEDED
OUTPATIENT
Start: 2024-09-11

## 2024-09-11 RX ORDER — FAMOTIDINE 10 MG/ML
20 INJECTION INTRAVENOUS ONCE AS NEEDED
OUTPATIENT
Start: 2024-09-25

## 2024-09-11 RX ORDER — HEPARIN SODIUM,PORCINE/PF 10 UNIT/ML
50 SYRINGE (ML) INTRAVENOUS AS NEEDED
OUTPATIENT
Start: 2024-09-11

## 2024-09-11 RX ORDER — DIPHENHYDRAMINE HYDROCHLORIDE 50 MG/ML
50 INJECTION INTRAMUSCULAR; INTRAVENOUS AS NEEDED
OUTPATIENT
Start: 2024-09-25

## 2024-09-11 RX ORDER — HEPARIN SODIUM 1000 [USP'U]/ML
2000 INJECTION, SOLUTION INTRAVENOUS; SUBCUTANEOUS AS NEEDED
OUTPATIENT
Start: 2024-09-11

## 2024-09-11 ASSESSMENT — PAIN SCALES - GENERAL: PAINLEVEL: 0-NO PAIN

## 2024-09-23 ENCOUNTER — APPOINTMENT (OUTPATIENT)
Dept: INFUSION THERAPY | Facility: HOSPITAL | Age: 29
End: 2024-09-23
Payer: MEDICAID

## 2024-09-26 NOTE — TELEPHONE ENCOUNTER
I called pt and advised her that the training kit is most likely from where we signed her up for the enytvio connect program. On the denial it does stated that she has to have tried/failed humira or amjevita. Patient stated that she was on humira around 2014 before she was started on enytvio. Would you like the pharmacy to rerun a PA for the enytvio pen to see if we can get it approved?

## 2024-09-26 NOTE — TELEPHONE ENCOUNTER
Patient is currently on IV Entyvio every 4 weeks. There is currently no clear data to guide dosing of SubQ Entyvio for patients that are on a shortened dosing schedule for Entyvio infusions. The information from the  for switching to SubQ is for standard Entyvio dosing (every 8 weeks) only. For that dose they recommend replacing the next scheduled IV Entyvio dose with the first SubQ dose and then continuing SubQ Entyvio every 2 weeks. It is unclear if patients on a shortened Entyvio dosing schedule would be able to transition to SubQ and if so what dosing schedule they would need for SubQ.    I will reach out to one of the IBD experts at  (Dr. Edge) to see if he has any thoughts on the use of SubQ Entyvio in patients that required dose optimization of IV Entyvio.    In the meantime she should continue infusions as scheduled.

## 2024-09-26 NOTE — TELEPHONE ENCOUNTER
Pt received Entyvio training kit yesterday.  She was wondering if you knew that they approve Entyvio now.    She also said they told her they did not approve it because they did not know she tried Humira before and there was some confusion on dosing.      She would like you to call her at  [phone]

## 2024-09-30 NOTE — TELEPHONE ENCOUNTER
Left message on machine to return call  - needs notified to continue infusions and Dr. Suero is going to check with Dr. Edge about the dosing on the pens since she is getting infusions ever 4 weeks.

## 2024-10-09 ENCOUNTER — INFUSION (OUTPATIENT)
Dept: INFUSION THERAPY | Facility: HOSPITAL | Age: 29
End: 2024-10-09
Payer: MEDICAID

## 2024-10-09 VITALS
WEIGHT: 173.72 LBS | HEART RATE: 72 BPM | RESPIRATION RATE: 16 BRPM | OXYGEN SATURATION: 99 % | TEMPERATURE: 99.7 F | SYSTOLIC BLOOD PRESSURE: 129 MMHG | BODY MASS INDEX: 29.3 KG/M2 | DIASTOLIC BLOOD PRESSURE: 91 MMHG

## 2024-10-09 DIAGNOSIS — K50.10 CROHN'S DISEASE OF LARGE INTESTINE WITHOUT COMPLICATION (MULTI): ICD-10-CM

## 2024-10-09 PROCEDURE — 96365 THER/PROPH/DIAG IV INF INIT: CPT | Mod: INF

## 2024-10-09 PROCEDURE — 2500000004 HC RX 250 GENERAL PHARMACY W/ HCPCS (ALT 636 FOR OP/ED): Performed by: INTERNAL MEDICINE

## 2024-10-09 RX ORDER — EPINEPHRINE 0.3 MG/.3ML
0.3 INJECTION SUBCUTANEOUS EVERY 5 MIN PRN
OUTPATIENT
Start: 2024-11-06

## 2024-10-09 RX ORDER — ALBUTEROL SULFATE 0.83 MG/ML
3 SOLUTION RESPIRATORY (INHALATION) AS NEEDED
OUTPATIENT
Start: 2024-11-06

## 2024-10-09 RX ORDER — HEPARIN SODIUM,PORCINE/PF 10 UNIT/ML
50 SYRINGE (ML) INTRAVENOUS AS NEEDED
OUTPATIENT
Start: 2024-10-09

## 2024-10-09 RX ORDER — HEPARIN SODIUM 1000 [USP'U]/ML
2000 INJECTION, SOLUTION INTRAVENOUS; SUBCUTANEOUS AS NEEDED
OUTPATIENT
Start: 2024-10-09

## 2024-10-09 RX ORDER — HEPARIN 100 UNIT/ML
500 SYRINGE INTRAVENOUS AS NEEDED
Status: DISCONTINUED | OUTPATIENT
Start: 2024-10-09 | End: 2024-10-09 | Stop reason: HOSPADM

## 2024-10-09 RX ORDER — FAMOTIDINE 10 MG/ML
20 INJECTION INTRAVENOUS ONCE AS NEEDED
OUTPATIENT
Start: 2024-11-06

## 2024-10-09 RX ORDER — DIPHENHYDRAMINE HYDROCHLORIDE 50 MG/ML
50 INJECTION INTRAMUSCULAR; INTRAVENOUS AS NEEDED
OUTPATIENT
Start: 2024-11-06

## 2024-10-09 RX ORDER — HEPARIN 100 UNIT/ML
500 SYRINGE INTRAVENOUS AS NEEDED
OUTPATIENT
Start: 2024-10-09

## 2024-10-09 ASSESSMENT — ENCOUNTER SYMPTOMS
DEPRESSION: 0
LOSS OF SENSATION IN FEET: 0
OCCASIONAL FEELINGS OF UNSTEADINESS: 0

## 2024-10-09 ASSESSMENT — PAIN SCALES - GENERAL: PAINLEVEL: 0-NO PAIN

## 2024-10-18 NOTE — TELEPHONE ENCOUNTER
Discussed case with IBD specialists (Dr. Edge and Dr. Mccall). They agreed that there is no data guiding transitioning of patients on other dosing intervals to SubQ Entyvio. They stated that there would be a risk of potential flare when making the switch. They suggested assessment of disease activity/entyvio drug level and then considering a move to q6-8 week IV Entyvio dosing before changing to SubQ.    Will discuss with patient further at next office visit.

## 2024-11-07 ENCOUNTER — INFUSION (OUTPATIENT)
Dept: INFUSION THERAPY | Facility: HOSPITAL | Age: 29
End: 2024-11-07
Payer: MEDICAID

## 2024-11-07 VITALS
SYSTOLIC BLOOD PRESSURE: 126 MMHG | OXYGEN SATURATION: 97 % | HEART RATE: 88 BPM | DIASTOLIC BLOOD PRESSURE: 76 MMHG | RESPIRATION RATE: 16 BRPM | TEMPERATURE: 97.4 F

## 2024-11-07 DIAGNOSIS — K50.10 CROHN'S DISEASE OF LARGE INTESTINE WITHOUT COMPLICATION (MULTI): ICD-10-CM

## 2024-11-07 PROCEDURE — 2500000004 HC RX 250 GENERAL PHARMACY W/ HCPCS (ALT 636 FOR OP/ED): Performed by: INTERNAL MEDICINE

## 2024-11-07 PROCEDURE — 96365 THER/PROPH/DIAG IV INF INIT: CPT | Mod: INF

## 2024-11-07 RX ORDER — EPINEPHRINE 0.3 MG/.3ML
0.3 INJECTION SUBCUTANEOUS EVERY 5 MIN PRN
OUTPATIENT
Start: 2024-12-04

## 2024-11-07 RX ORDER — HEPARIN 100 UNIT/ML
500 SYRINGE INTRAVENOUS AS NEEDED
Status: DISCONTINUED | OUTPATIENT
Start: 2024-11-07 | End: 2024-11-07 | Stop reason: HOSPADM

## 2024-11-07 RX ORDER — HEPARIN SODIUM,PORCINE/PF 10 UNIT/ML
50 SYRINGE (ML) INTRAVENOUS AS NEEDED
OUTPATIENT
Start: 2024-11-07

## 2024-11-07 RX ORDER — HEPARIN SODIUM 1000 [USP'U]/ML
2000 INJECTION, SOLUTION INTRAVENOUS; SUBCUTANEOUS AS NEEDED
OUTPATIENT
Start: 2024-11-07

## 2024-11-07 RX ORDER — HEPARIN SODIUM,PORCINE/PF 10 UNIT/ML
50 SYRINGE (ML) INTRAVENOUS AS NEEDED
Status: DISCONTINUED | OUTPATIENT
Start: 2024-11-07 | End: 2024-11-07 | Stop reason: HOSPADM

## 2024-11-07 RX ORDER — DIPHENHYDRAMINE HYDROCHLORIDE 50 MG/ML
50 INJECTION INTRAMUSCULAR; INTRAVENOUS AS NEEDED
OUTPATIENT
Start: 2024-12-04

## 2024-11-07 RX ORDER — FAMOTIDINE 10 MG/ML
20 INJECTION INTRAVENOUS ONCE AS NEEDED
OUTPATIENT
Start: 2024-12-04

## 2024-11-07 RX ORDER — HEPARIN 100 UNIT/ML
500 SYRINGE INTRAVENOUS AS NEEDED
OUTPATIENT
Start: 2024-11-07

## 2024-11-07 RX ORDER — ALBUTEROL SULFATE 0.83 MG/ML
3 SOLUTION RESPIRATORY (INHALATION) AS NEEDED
OUTPATIENT
Start: 2024-12-04

## 2024-11-07 ASSESSMENT — ENCOUNTER SYMPTOMS
LOSS OF SENSATION IN FEET: 0
OCCASIONAL FEELINGS OF UNSTEADINESS: 0
DEPRESSION: 0

## 2024-12-05 ENCOUNTER — INFUSION (OUTPATIENT)
Dept: INFUSION THERAPY | Facility: HOSPITAL | Age: 29
End: 2024-12-05
Payer: MEDICAID

## 2024-12-05 VITALS
OXYGEN SATURATION: 100 % | HEART RATE: 76 BPM | SYSTOLIC BLOOD PRESSURE: 128 MMHG | DIASTOLIC BLOOD PRESSURE: 89 MMHG | RESPIRATION RATE: 16 BRPM | TEMPERATURE: 97.7 F

## 2024-12-05 DIAGNOSIS — K50.10 CROHN'S DISEASE OF LARGE INTESTINE WITHOUT COMPLICATION (MULTI): ICD-10-CM

## 2024-12-05 PROCEDURE — 96365 THER/PROPH/DIAG IV INF INIT: CPT | Mod: INF

## 2024-12-05 PROCEDURE — 2500000004 HC RX 250 GENERAL PHARMACY W/ HCPCS (ALT 636 FOR OP/ED): Mod: JZ | Performed by: INTERNAL MEDICINE

## 2024-12-05 RX ORDER — HEPARIN 100 UNIT/ML
500 SYRINGE INTRAVENOUS AS NEEDED
Status: DISCONTINUED | OUTPATIENT
Start: 2024-12-05 | End: 2024-12-05 | Stop reason: HOSPADM

## 2024-12-05 RX ORDER — HEPARIN 100 UNIT/ML
500 SYRINGE INTRAVENOUS AS NEEDED
OUTPATIENT
Start: 2024-12-05

## 2024-12-05 RX ORDER — ALBUTEROL SULFATE 0.83 MG/ML
3 SOLUTION RESPIRATORY (INHALATION) AS NEEDED
OUTPATIENT
Start: 2025-01-02

## 2024-12-05 RX ORDER — HEPARIN SODIUM,PORCINE/PF 10 UNIT/ML
50 SYRINGE (ML) INTRAVENOUS AS NEEDED
OUTPATIENT
Start: 2024-12-05

## 2024-12-05 RX ORDER — DIPHENHYDRAMINE HYDROCHLORIDE 50 MG/ML
50 INJECTION INTRAMUSCULAR; INTRAVENOUS AS NEEDED
OUTPATIENT
Start: 2025-01-02

## 2024-12-05 RX ORDER — FAMOTIDINE 10 MG/ML
20 INJECTION INTRAVENOUS ONCE AS NEEDED
OUTPATIENT
Start: 2025-01-02

## 2024-12-05 RX ORDER — EPINEPHRINE 0.3 MG/.3ML
0.3 INJECTION SUBCUTANEOUS EVERY 5 MIN PRN
OUTPATIENT
Start: 2025-01-02

## 2024-12-05 RX ORDER — HEPARIN SODIUM 1000 [USP'U]/ML
2000 INJECTION, SOLUTION INTRAVENOUS; SUBCUTANEOUS AS NEEDED
OUTPATIENT
Start: 2024-12-05

## 2024-12-05 ASSESSMENT — ENCOUNTER SYMPTOMS
OCCASIONAL FEELINGS OF UNSTEADINESS: 0
DEPRESSION: 0
LOSS OF SENSATION IN FEET: 0

## 2025-01-02 ENCOUNTER — APPOINTMENT (OUTPATIENT)
Dept: INFUSION THERAPY | Facility: HOSPITAL | Age: 30
End: 2025-01-02
Payer: MEDICAID

## 2025-01-20 ENCOUNTER — APPOINTMENT (OUTPATIENT)
Dept: INFUSION THERAPY | Facility: HOSPITAL | Age: 30
End: 2025-01-20
Payer: MEDICAID

## 2025-01-21 ENCOUNTER — TELEPHONE (OUTPATIENT)
Dept: OBSTETRICS AND GYNECOLOGY | Facility: CLINIC | Age: 30
End: 2025-01-21
Payer: MEDICAID

## 2025-01-21 DIAGNOSIS — N89.8 VAGINAL IRRITATION: Primary | ICD-10-CM

## 2025-01-21 RX ORDER — CLOTRIMAZOLE AND BETAMETHASONE DIPROPIONATE 10; .64 MG/G; MG/G
1 CREAM TOPICAL 2 TIMES DAILY
Qty: 15 G | Refills: 0 | Status: SHIPPED | OUTPATIENT
Start: 2025-01-21 | End: 2025-02-18

## 2025-01-21 NOTE — TELEPHONE ENCOUNTER
Patient has appointment on 02/14/2025 needs [facility] [address]

## 2025-02-04 ENCOUNTER — INFUSION (OUTPATIENT)
Dept: INFUSION THERAPY | Facility: HOSPITAL | Age: 30
End: 2025-02-04
Payer: MEDICAID

## 2025-02-04 VITALS
DIASTOLIC BLOOD PRESSURE: 80 MMHG | RESPIRATION RATE: 16 BRPM | TEMPERATURE: 97.8 F | HEART RATE: 97 BPM | SYSTOLIC BLOOD PRESSURE: 132 MMHG | BODY MASS INDEX: 27.55 KG/M2 | OXYGEN SATURATION: 98 % | WEIGHT: 163.36 LBS

## 2025-02-04 DIAGNOSIS — K50.10 CROHN'S DISEASE OF LARGE INTESTINE WITHOUT COMPLICATION (MULTI): ICD-10-CM

## 2025-02-04 PROCEDURE — 2500000004 HC RX 250 GENERAL PHARMACY W/ HCPCS (ALT 636 FOR OP/ED): Performed by: INTERNAL MEDICINE

## 2025-02-04 PROCEDURE — 96365 THER/PROPH/DIAG IV INF INIT: CPT | Mod: INF

## 2025-02-04 RX ORDER — DIPHENHYDRAMINE HYDROCHLORIDE 50 MG/ML
50 INJECTION INTRAMUSCULAR; INTRAVENOUS AS NEEDED
OUTPATIENT
Start: 2025-02-27

## 2025-02-04 RX ORDER — EPINEPHRINE 0.3 MG/.3ML
0.3 INJECTION SUBCUTANEOUS EVERY 5 MIN PRN
OUTPATIENT
Start: 2025-02-27

## 2025-02-04 RX ORDER — HEPARIN SODIUM 1000 [USP'U]/ML
2000 INJECTION, SOLUTION INTRAVENOUS; SUBCUTANEOUS AS NEEDED
OUTPATIENT
Start: 2025-02-04

## 2025-02-04 RX ORDER — FAMOTIDINE 10 MG/ML
20 INJECTION INTRAVENOUS ONCE AS NEEDED
OUTPATIENT
Start: 2025-02-27

## 2025-02-04 RX ORDER — HEPARIN 100 UNIT/ML
500 SYRINGE INTRAVENOUS AS NEEDED
Status: DISCONTINUED | OUTPATIENT
Start: 2025-02-04 | End: 2025-02-04 | Stop reason: HOSPADM

## 2025-02-04 RX ORDER — HEPARIN SODIUM,PORCINE/PF 10 UNIT/ML
50 SYRINGE (ML) INTRAVENOUS AS NEEDED
OUTPATIENT
Start: 2025-02-04

## 2025-02-04 RX ORDER — ALBUTEROL SULFATE 0.83 MG/ML
3 SOLUTION RESPIRATORY (INHALATION) AS NEEDED
OUTPATIENT
Start: 2025-02-27

## 2025-02-04 RX ORDER — HEPARIN 100 UNIT/ML
500 SYRINGE INTRAVENOUS AS NEEDED
OUTPATIENT
Start: 2025-02-04

## 2025-02-04 RX ADMIN — VEDOLIZUMAB 300 MG: 300 INJECTION, POWDER, LYOPHILIZED, FOR SOLUTION INTRAVENOUS at 09:40

## 2025-02-04 RX ADMIN — Medication 500 UNITS: at 10:13

## 2025-02-04 ASSESSMENT — ENCOUNTER SYMPTOMS
LOSS OF SENSATION IN FEET: 0
OCCASIONAL FEELINGS OF UNSTEADINESS: 0
DEPRESSION: 0

## 2025-02-14 ENCOUNTER — APPOINTMENT (OUTPATIENT)
Dept: OBSTETRICS AND GYNECOLOGY | Facility: CLINIC | Age: 30
End: 2025-02-14
Payer: MEDICAID

## 2025-03-04 ENCOUNTER — APPOINTMENT (OUTPATIENT)
Dept: INFUSION THERAPY | Facility: HOSPITAL | Age: 30
End: 2025-03-04

## 2025-03-14 ENCOUNTER — APPOINTMENT (OUTPATIENT)
Dept: OBSTETRICS AND GYNECOLOGY | Facility: CLINIC | Age: 30
End: 2025-03-14
Payer: MEDICAID

## 2025-04-17 ENCOUNTER — APPOINTMENT (OUTPATIENT)
Dept: OBSTETRICS AND GYNECOLOGY | Facility: CLINIC | Age: 30
End: 2025-04-17

## 2025-04-17 VITALS — BODY MASS INDEX: 26.65 KG/M2 | SYSTOLIC BLOOD PRESSURE: 120 MMHG | DIASTOLIC BLOOD PRESSURE: 70 MMHG | WEIGHT: 158 LBS

## 2025-04-17 DIAGNOSIS — Z11.51 SCREENING FOR HPV (HUMAN PAPILLOMAVIRUS): ICD-10-CM

## 2025-04-17 DIAGNOSIS — Z11.3 SCREEN FOR STD (SEXUALLY TRANSMITTED DISEASE): ICD-10-CM

## 2025-04-17 DIAGNOSIS — Z12.4 SCREENING FOR CERVICAL CANCER: Primary | ICD-10-CM

## 2025-04-17 DIAGNOSIS — N89.8 VAGINAL DISCHARGE: ICD-10-CM

## 2025-04-17 PROCEDURE — 87591 N.GONORRHOEAE DNA AMP PROB: CPT

## 2025-04-17 PROCEDURE — 87624 HPV HI-RISK TYP POOLED RSLT: CPT

## 2025-04-17 PROCEDURE — 87491 CHLMYD TRACH DNA AMP PROBE: CPT

## 2025-04-17 PROCEDURE — 99214 OFFICE O/P EST MOD 30 MIN: CPT | Performed by: NURSE PRACTITIONER

## 2025-04-17 PROCEDURE — 87661 TRICHOMONAS VAGINALIS AMPLIF: CPT

## 2025-04-17 PROCEDURE — 1036F TOBACCO NON-USER: CPT | Performed by: NURSE PRACTITIONER

## 2025-04-17 NOTE — PROGRESS NOTES
Subjective   Patient ID: Krystina Wheatley is a 29 y.o. female who presents for Repeat Pap (Reviewing  EMR/Last Annual Exam: 07/26/2024/Negative Pap / Positive HPV 07/26/2024/).  HPI  Here for repeat PAP.   She has a hx of crohn's. Has a port, on Entyvio infusions.   PAP Hx:   2023: ASCUS, + HPV  2024: NILM, + HPPV  Due for repeat PAP today.   Pt had a recent change in partners; requests STD testing.   Currently on Joslyn. Feels this is working well for her; not interested in changing her ocp at this time.      Review of Systems   Genitourinary:  Positive for vaginal discharge.   All other systems reviewed and are negative.      Objective   Physical Exam  Constitutional:       Appearance: Normal appearance.   Pulmonary:      Effort: Pulmonary effort is normal.   Genitourinary:     General: Normal vulva.      Vagina: Normal.      Cervix: Normal.      Uterus: Normal.       Adnexa: Right adnexa normal and left adnexa normal.   Skin:     General: Skin is warm and dry.   Neurological:      Mental Status: She is alert.   Psychiatric:         Mood and Affect: Mood normal.         Behavior: Behavior normal.         Assessment/Plan   Diagnoses and all orders for this visit:  Screening for cervical cancer  -     THINPREP PAP TEST (>30)  Screening for HPV (human papillomavirus)  -     THINPREP PAP TEST (>30)  Screen for STD (sexually transmitted disease)  -     THINPREP PAP TEST (>30)  -     HIV 1/2 Antigen/Antibody Screen with Reflex to Confirmation; Future  -     Hepatitis B Surface Antigen; Future  -     Hepatitis C Antibody; Future  -     Syphilis Screen with Reflex; Future  Vaginal discharge  -     Vaginitis Gram Stain For Bacterial Vaginosis + Yeast    Discussed changing from an oral ocp to a patch for better absorption d/t her Crohn's history.  She does not want to change her pill at this time. Has been on it for awhile and feels it is a good fit for her. Encouraged condom use.    Follow-up annually; sooner if needed.     Beti Roberts, MATILDA-CNP 04/17/25 11:00 AM

## 2025-04-18 DIAGNOSIS — B96.89 BV (BACTERIAL VAGINOSIS): Primary | ICD-10-CM

## 2025-04-18 DIAGNOSIS — N76.0 BV (BACTERIAL VAGINOSIS): Primary | ICD-10-CM

## 2025-04-18 LAB
BV SCORE VAG QL: ABNORMAL
C TRACH RRNA SPEC QL NAA+PROBE: NEGATIVE
HBV SURFACE AG SERPL QL IA: NORMAL
HCV AB SERPL QL IA: NORMAL
HIV 1+2 AB+HIV1 P24 AG SERPL QL IA: NORMAL
N GONORRHOEA DNA SPEC QL PROBE+SIG AMP: NEGATIVE
T PALLIDUM AB SER QL IA: NORMAL
T VAGINALIS RRNA SPEC QL NAA+PROBE: NEGATIVE

## 2025-04-18 RX ORDER — METRONIDAZOLE 7.5 MG/G
GEL VAGINAL DAILY
Qty: 70 G | Refills: 0 | Status: SHIPPED | OUTPATIENT
Start: 2025-04-18 | End: 2025-04-23

## 2025-04-22 LAB
HBV SURFACE AG SERPL QL IA: NORMAL
HCV AB SERPL QL IA: NORMAL
HIV 1+2 AB+HIV1 P24 AG SERPL QL IA: NORMAL
T PALLIDUM AB SER QL IA: NEGATIVE

## 2025-04-30 ENCOUNTER — INFUSION (OUTPATIENT)
Dept: INFUSION THERAPY | Facility: HOSPITAL | Age: 30
End: 2025-04-30
Payer: MEDICAID

## 2025-04-30 VITALS
OXYGEN SATURATION: 98 % | WEIGHT: 159.61 LBS | SYSTOLIC BLOOD PRESSURE: 134 MMHG | TEMPERATURE: 97.3 F | HEART RATE: 78 BPM | BODY MASS INDEX: 26.92 KG/M2 | RESPIRATION RATE: 16 BRPM | DIASTOLIC BLOOD PRESSURE: 90 MMHG

## 2025-04-30 DIAGNOSIS — K50.10 CROHN'S DISEASE OF LARGE INTESTINE WITHOUT COMPLICATION (MULTI): ICD-10-CM

## 2025-04-30 LAB
CYTOLOGY CMNT CVX/VAG CYTO-IMP: NORMAL
HPV HR 12 DNA GENITAL QL NAA+PROBE: POSITIVE
HPV HR GENOTYPES PNL CVX NAA+PROBE: POSITIVE
HPV16 DNA SPEC QL NAA+PROBE: NEGATIVE
HPV18 DNA SPEC QL NAA+PROBE: NEGATIVE
LAB AP HPV GENOTYPE QUESTION: YES
LAB AP HPV HR: NORMAL
LAB AP PAP ADDITIONAL TESTS: NORMAL
LAB AP PREVIOUS ABNORMAL HISTORY: NORMAL
LABORATORY COMMENT REPORT: NORMAL
PATH REPORT.TOTAL CANCER: NORMAL

## 2025-04-30 PROCEDURE — 2500000004 HC RX 250 GENERAL PHARMACY W/ HCPCS (ALT 636 FOR OP/ED): Mod: JZ | Performed by: INTERNAL MEDICINE

## 2025-04-30 PROCEDURE — 96365 THER/PROPH/DIAG IV INF INIT: CPT | Mod: INF

## 2025-04-30 RX ORDER — ALBUTEROL SULFATE 0.83 MG/ML
3 SOLUTION RESPIRATORY (INHALATION) AS NEEDED
OUTPATIENT
Start: 2025-05-27

## 2025-04-30 RX ORDER — FAMOTIDINE 10 MG/ML
20 INJECTION, SOLUTION INTRAVENOUS ONCE AS NEEDED
OUTPATIENT
Start: 2025-05-27

## 2025-04-30 RX ORDER — HEPARIN 100 UNIT/ML
500 SYRINGE INTRAVENOUS AS NEEDED
OUTPATIENT
Start: 2025-04-30

## 2025-04-30 RX ORDER — DIPHENHYDRAMINE HYDROCHLORIDE 50 MG/ML
50 INJECTION, SOLUTION INTRAMUSCULAR; INTRAVENOUS AS NEEDED
OUTPATIENT
Start: 2025-05-27

## 2025-04-30 RX ORDER — HEPARIN SODIUM 1000 [USP'U]/ML
2000 INJECTION, SOLUTION INTRAVENOUS; SUBCUTANEOUS AS NEEDED
OUTPATIENT
Start: 2025-04-30

## 2025-04-30 RX ORDER — EPINEPHRINE 0.3 MG/.3ML
0.3 INJECTION SUBCUTANEOUS EVERY 5 MIN PRN
OUTPATIENT
Start: 2025-05-27

## 2025-04-30 RX ORDER — HEPARIN SODIUM,PORCINE/PF 10 UNIT/ML
50 SYRINGE (ML) INTRAVENOUS AS NEEDED
OUTPATIENT
Start: 2025-04-30

## 2025-04-30 RX ORDER — HEPARIN 100 UNIT/ML
500 SYRINGE INTRAVENOUS AS NEEDED
Status: DISCONTINUED | OUTPATIENT
Start: 2025-04-30 | End: 2025-04-30 | Stop reason: HOSPADM

## 2025-04-30 RX ADMIN — VEDOLIZUMAB 300 MG: 300 INJECTION, POWDER, LYOPHILIZED, FOR SOLUTION INTRAVENOUS at 11:44

## 2025-04-30 RX ADMIN — HEPARIN 500 UNITS: 100 SYRINGE at 12:29

## 2025-04-30 SDOH — ECONOMIC STABILITY: FOOD INSECURITY: WITHIN THE PAST 12 MONTHS, YOU WORRIED THAT YOUR FOOD WOULD RUN OUT BEFORE YOU GOT MONEY TO BUY MORE.: NEVER TRUE

## 2025-04-30 SDOH — ECONOMIC STABILITY: FOOD INSECURITY: WITHIN THE PAST 12 MONTHS, THE FOOD YOU BOUGHT JUST DIDN'T LAST AND YOU DIDN'T HAVE MONEY TO GET MORE.: NEVER TRUE

## 2025-04-30 ASSESSMENT — COLUMBIA-SUICIDE SEVERITY RATING SCALE - C-SSRS
1. IN THE PAST MONTH, HAVE YOU WISHED YOU WERE DEAD OR WISHED YOU COULD GO TO SLEEP AND NOT WAKE UP?: NO
6. HAVE YOU EVER DONE ANYTHING, STARTED TO DO ANYTHING, OR PREPARED TO DO ANYTHING TO END YOUR LIFE?: NO
2. HAVE YOU ACTUALLY HAD ANY THOUGHTS OF KILLING YOURSELF?: NO

## 2025-04-30 ASSESSMENT — PATIENT HEALTH QUESTIONNAIRE - PHQ9
1. LITTLE INTEREST OR PLEASURE IN DOING THINGS: NOT AT ALL
SUM OF ALL RESPONSES TO PHQ9 QUESTIONS 1 AND 2: 0
2. FEELING DOWN, DEPRESSED OR HOPELESS: NOT AT ALL

## 2025-04-30 ASSESSMENT — ENCOUNTER SYMPTOMS
DEPRESSION: 0
OCCASIONAL FEELINGS OF UNSTEADINESS: 0
LOSS OF SENSATION IN FEET: 0

## 2025-04-30 ASSESSMENT — PAIN SCALES - GENERAL: PAINLEVEL_OUTOF10: 0-NO PAIN

## 2025-05-28 ENCOUNTER — INFUSION (OUTPATIENT)
Dept: INFUSION THERAPY | Facility: HOSPITAL | Age: 30
End: 2025-05-28
Payer: MEDICAID

## 2025-05-28 VITALS
DIASTOLIC BLOOD PRESSURE: 91 MMHG | RESPIRATION RATE: 18 BRPM | SYSTOLIC BLOOD PRESSURE: 135 MMHG | TEMPERATURE: 97.2 F | HEART RATE: 85 BPM | OXYGEN SATURATION: 99 %

## 2025-05-28 DIAGNOSIS — K50.10 CROHN'S DISEASE OF LARGE INTESTINE WITHOUT COMPLICATION (MULTI): ICD-10-CM

## 2025-05-28 PROCEDURE — 96365 THER/PROPH/DIAG IV INF INIT: CPT | Mod: INF

## 2025-05-28 PROCEDURE — 2500000004 HC RX 250 GENERAL PHARMACY W/ HCPCS (ALT 636 FOR OP/ED): Performed by: INTERNAL MEDICINE

## 2025-05-28 RX ORDER — HEPARIN SODIUM,PORCINE/PF 10 UNIT/ML
50 SYRINGE (ML) INTRAVENOUS AS NEEDED
OUTPATIENT
Start: 2025-05-28

## 2025-05-28 RX ORDER — HEPARIN SODIUM 1000 [USP'U]/ML
2000 INJECTION, SOLUTION INTRAVENOUS; SUBCUTANEOUS AS NEEDED
OUTPATIENT
Start: 2025-05-28

## 2025-05-28 RX ORDER — HEPARIN 100 UNIT/ML
500 SYRINGE INTRAVENOUS AS NEEDED
Status: DISCONTINUED | OUTPATIENT
Start: 2025-05-28 | End: 2025-05-28 | Stop reason: HOSPADM

## 2025-05-28 RX ORDER — EPINEPHRINE 0.3 MG/.3ML
0.3 INJECTION SUBCUTANEOUS EVERY 5 MIN PRN
OUTPATIENT
Start: 2025-06-25

## 2025-05-28 RX ORDER — ALBUTEROL SULFATE 0.83 MG/ML
3 SOLUTION RESPIRATORY (INHALATION) AS NEEDED
OUTPATIENT
Start: 2025-06-25

## 2025-05-28 RX ORDER — HEPARIN 100 UNIT/ML
500 SYRINGE INTRAVENOUS AS NEEDED
OUTPATIENT
Start: 2025-05-28

## 2025-05-28 RX ORDER — FAMOTIDINE 10 MG/ML
20 INJECTION, SOLUTION INTRAVENOUS ONCE AS NEEDED
OUTPATIENT
Start: 2025-06-25

## 2025-05-28 RX ORDER — DIPHENHYDRAMINE HYDROCHLORIDE 50 MG/ML
50 INJECTION, SOLUTION INTRAMUSCULAR; INTRAVENOUS AS NEEDED
OUTPATIENT
Start: 2025-06-25

## 2025-05-28 RX ADMIN — VEDOLIZUMAB 300 MG: 300 INJECTION, POWDER, LYOPHILIZED, FOR SOLUTION INTRAVENOUS at 12:05

## 2025-05-28 RX ADMIN — HEPARIN 500 UNITS: 100 SYRINGE at 12:57

## 2025-05-28 ASSESSMENT — ENCOUNTER SYMPTOMS
DEPRESSION: 0
OCCASIONAL FEELINGS OF UNSTEADINESS: 0
LOSS OF SENSATION IN FEET: 0

## 2025-05-28 ASSESSMENT — PAIN SCALES - GENERAL: PAINLEVEL_OUTOF10: 0-NO PAIN

## 2025-05-30 ENCOUNTER — OFFICE VISIT (OUTPATIENT)
Dept: URGENT CARE | Age: 30
End: 2025-05-30
Payer: MEDICAID

## 2025-05-30 VITALS
OXYGEN SATURATION: 98 % | HEART RATE: 88 BPM | RESPIRATION RATE: 16 BRPM | SYSTOLIC BLOOD PRESSURE: 135 MMHG | TEMPERATURE: 97.5 F | DIASTOLIC BLOOD PRESSURE: 85 MMHG

## 2025-05-30 DIAGNOSIS — L24.7 IRRITANT CONTACT DERMATITIS DUE TO PLANTS, EXCEPT FOOD: Primary | ICD-10-CM

## 2025-05-30 PROCEDURE — 99213 OFFICE O/P EST LOW 20 MIN: CPT | Performed by: NURSE PRACTITIONER

## 2025-05-30 RX ORDER — TRIAMCINOLONE ACETONIDE 1 MG/G
CREAM TOPICAL
Qty: 80 G | Refills: 0 | Status: SHIPPED | OUTPATIENT
Start: 2025-05-30

## 2025-05-30 RX ORDER — PREDNISONE 10 MG/1
TABLET ORAL
Qty: 35 TABLET | Refills: 0 | Status: SHIPPED | OUTPATIENT
Start: 2025-05-30

## 2025-05-30 ASSESSMENT — ENCOUNTER SYMPTOMS
NAIL CHANGES: 0
SORE THROAT: 0
FEVER: 0
RHINORRHEA: 0
VOMITING: 0
ANOREXIA: 0
COUGH: 0
DIARRHEA: 0
SHORTNESS OF BREATH: 0
EYE PAIN: 0
FATIGUE: 0

## 2025-05-30 NOTE — PROGRESS NOTES
Subjective   Patient ID: Krystina Wheatley is a 29 y.o. female. They present today with a chief complaint of generalized rash.    History of Present Illness    History provided by:  Patient   used: No    Rash  This is a new problem. Episode onset: 4 days. Location: Left ear, chest, breast, abdomen and bilateral arms. The rash is characterized by redness and itchiness. She was exposed to plant contact (Patient was cleaning in her backyard 4 days ago). Pertinent negatives include no anorexia, congestion, cough, diarrhea, eye pain, facial edema, fatigue, fever, joint pain, nail changes, rhinorrhea, shortness of breath, sore throat or vomiting. Treatments tried: Technu, calamine lotion, over-the-counter hydrocortisone cream. The treatment provided no relief. There is no history of allergies, asthma, eczema or varicella.       Past Medical History  Allergies as of 05/30/2025    (No Known Allergies)       Prescriptions Prior to Admission[1]     Medical History[2]    Surgical History[3]     reports that she has never smoked. She has never used smokeless tobacco. She reports current alcohol use. She reports current drug use. Drug: Marijuana.    Review of Systems  Review of Systems   Constitutional:  Negative for fatigue and fever.   HENT:  Negative for congestion, rhinorrhea and sore throat.    Eyes:  Negative for pain.   Respiratory:  Negative for cough and shortness of breath.    Gastrointestinal:  Negative for anorexia, diarrhea and vomiting.   Musculoskeletal:  Negative for joint pain.   Skin:  Positive for rash. Negative for nail changes.     At the elbow okay  Objective    Vitals:    05/30/25 1519   BP: 135/85   Pulse: 88   Resp: 16   Temp: 36.4 °C (97.5 °F)   SpO2: 98%     No LMP recorded.    Physical Exam  Vitals and nursing note reviewed.   Constitutional:       Appearance: Normal appearance.   HENT:      Head: Normocephalic and atraumatic.   Cardiovascular:      Rate and Rhythm: Normal rate  and regular rhythm.   Pulmonary:      Effort: Pulmonary effort is normal.      Breath sounds: Normal breath sounds.   Skin:     Comments: Left earlobe with redness, swelling and warmth.  No tenderness, bleeding or discharge noted.  Erythematous papules noted on chest, bilateral breast.  No bleeding discharge or tenderness.   Neurological:      Mental Status: She is alert.         Procedures    Point of Care Test & Imaging Results from this visit  No results found for this visit on 05/30/25.   Imaging  No results found.    Cardiology, Vascular, and Other Imaging  No other imaging results found for the past 2 days      Diagnostic study results (if any) were reviewed by BUSTER Cassidy.    Assessment/Plan   Allergies, medications, history, and pertinent labs/EKGs/Imaging reviewed by BUSTER Cassidy.     Medical Decision Making  -Avoid using or touching whatever might have caused your rash.  -Protect your skin from anything that might irritate it or cause an allergy. For example, wear gloves if you need to work with harsh soaps.  -Avoid scratching your skin. It might help to:  -Wear cotton gloves at night.  -Keep your nails short and clean.  -Cover the parts of your skin that itch.  F/u with PCP if you develop the following.  -You have a rash that does not go away within 2 weeks.  -Your rash gets worse or spreads over large parts of your body.  -You have signs of infection like swelling, warmth, pain, or fever.  Pt verbalized understanding of the instructions.      Orders and Diagnoses  Diagnoses and all orders for this visit:  Irritant contact dermatitis due to plants, except food  -     predniSONE (Deltasone) 10 mg tablet; Take 4 tabs by mouth daily for 5 days, then take 2 tabs by mouth daily for 5 days, then take 1 tab by mouth daily for 5 days with food.  -     triamcinolone (Kenalog) 0.1 % cream; Apply to affected area 1-2 times daily as needed. Avoid face and groin.      Medical Admin  Record      Patient disposition: Home    Electronically signed by BUSTER Cassidy  3:36 PM           [1] (Not in a hospital admission)   [2]   Past Medical History:  Diagnosis Date    Anxiety     CD (Crohn's disease) (Multi)     Depression     GERD (gastroesophageal reflux disease)     Personal history of other diseases of the digestive system     History of irritable bowel syndrome   [3]   Past Surgical History:  Procedure Laterality Date    COLONOSCOPY      COLPOSCOPY N/A 08/15/2023    OTHER SURGICAL HISTORY      Foot surgery    OTHER SURGICAL HISTORY  11/2021    Tibia fracture repair    UPPER GASTROINTESTINAL ENDOSCOPY

## 2025-06-25 ENCOUNTER — INFUSION (OUTPATIENT)
Dept: INFUSION THERAPY | Facility: HOSPITAL | Age: 30
End: 2025-06-25
Payer: MEDICAID

## 2025-06-25 ENCOUNTER — APPOINTMENT (OUTPATIENT)
Dept: OBSTETRICS AND GYNECOLOGY | Facility: CLINIC | Age: 30
End: 2025-06-25
Payer: MEDICAID

## 2025-06-25 VITALS
OXYGEN SATURATION: 99 % | HEART RATE: 80 BPM | RESPIRATION RATE: 18 BRPM | TEMPERATURE: 99 F | BODY MASS INDEX: 27.83 KG/M2 | DIASTOLIC BLOOD PRESSURE: 87 MMHG | WEIGHT: 165 LBS | SYSTOLIC BLOOD PRESSURE: 131 MMHG

## 2025-06-25 DIAGNOSIS — K50.10 CROHN'S DISEASE OF LARGE INTESTINE WITHOUT COMPLICATION (MULTI): ICD-10-CM

## 2025-06-25 PROCEDURE — 96365 THER/PROPH/DIAG IV INF INIT: CPT | Mod: INF

## 2025-06-25 PROCEDURE — 2500000004 HC RX 250 GENERAL PHARMACY W/ HCPCS (ALT 636 FOR OP/ED): Performed by: INTERNAL MEDICINE

## 2025-06-25 RX ORDER — HEPARIN 100 UNIT/ML
500 SYRINGE INTRAVENOUS AS NEEDED
OUTPATIENT
Start: 2025-06-25

## 2025-06-25 RX ORDER — ALBUTEROL SULFATE 0.83 MG/ML
3 SOLUTION RESPIRATORY (INHALATION) AS NEEDED
OUTPATIENT
Start: 2025-07-23

## 2025-06-25 RX ORDER — DIPHENHYDRAMINE HYDROCHLORIDE 50 MG/ML
50 INJECTION, SOLUTION INTRAMUSCULAR; INTRAVENOUS AS NEEDED
OUTPATIENT
Start: 2025-07-23

## 2025-06-25 RX ORDER — HEPARIN SODIUM 1000 [USP'U]/ML
2000 INJECTION, SOLUTION INTRAVENOUS; SUBCUTANEOUS AS NEEDED
Status: DISCONTINUED | OUTPATIENT
Start: 2025-06-25 | End: 2025-06-25 | Stop reason: HOSPADM

## 2025-06-25 RX ORDER — HEPARIN 100 UNIT/ML
500 SYRINGE INTRAVENOUS AS NEEDED
Status: DISCONTINUED | OUTPATIENT
Start: 2025-06-25 | End: 2025-06-25 | Stop reason: HOSPADM

## 2025-06-25 RX ORDER — HEPARIN SODIUM 1000 [USP'U]/ML
2000 INJECTION, SOLUTION INTRAVENOUS; SUBCUTANEOUS AS NEEDED
OUTPATIENT
Start: 2025-06-25

## 2025-06-25 RX ORDER — EPINEPHRINE 0.3 MG/.3ML
0.3 INJECTION SUBCUTANEOUS EVERY 5 MIN PRN
OUTPATIENT
Start: 2025-07-23

## 2025-06-25 RX ORDER — FAMOTIDINE 10 MG/ML
20 INJECTION, SOLUTION INTRAVENOUS ONCE AS NEEDED
OUTPATIENT
Start: 2025-07-23

## 2025-06-25 RX ORDER — HEPARIN SODIUM,PORCINE/PF 10 UNIT/ML
50 SYRINGE (ML) INTRAVENOUS AS NEEDED
OUTPATIENT
Start: 2025-06-25

## 2025-06-25 RX ORDER — HEPARIN SODIUM,PORCINE/PF 10 UNIT/ML
50 SYRINGE (ML) INTRAVENOUS AS NEEDED
Status: DISCONTINUED | OUTPATIENT
Start: 2025-06-25 | End: 2025-06-25 | Stop reason: HOSPADM

## 2025-06-25 RX ADMIN — HEPARIN 500 UNITS: 100 SYRINGE at 13:16

## 2025-06-25 RX ADMIN — VEDOLIZUMAB 300 MG: 300 INJECTION, POWDER, LYOPHILIZED, FOR SOLUTION INTRAVENOUS at 12:12

## 2025-06-25 ASSESSMENT — PAIN SCALES - GENERAL: PAINLEVEL_OUTOF10: 0-NO PAIN

## 2025-07-14 ENCOUNTER — APPOINTMENT (OUTPATIENT)
Dept: OBSTETRICS AND GYNECOLOGY | Facility: CLINIC | Age: 30
End: 2025-07-14
Payer: MEDICAID

## 2025-07-14 VITALS
BODY MASS INDEX: 27.32 KG/M2 | DIASTOLIC BLOOD PRESSURE: 78 MMHG | HEIGHT: 65 IN | SYSTOLIC BLOOD PRESSURE: 128 MMHG | WEIGHT: 164 LBS

## 2025-07-14 DIAGNOSIS — R87.810 ASCUS WITH POSITIVE HIGH RISK HPV CERVICAL: ICD-10-CM

## 2025-07-14 DIAGNOSIS — Z32.02 PREGNANCY TEST NEGATIVE: ICD-10-CM

## 2025-07-14 DIAGNOSIS — R87.610 ASCUS WITH POSITIVE HIGH RISK HPV CERVICAL: ICD-10-CM

## 2025-07-14 LAB — PREGNANCY TEST URINE, POC: NEGATIVE

## 2025-07-14 PROCEDURE — 81025 URINE PREGNANCY TEST: CPT | Performed by: STUDENT IN AN ORGANIZED HEALTH CARE EDUCATION/TRAINING PROGRAM

## 2025-07-14 PROCEDURE — 57454 BX/CURETT OF CERVIX W/SCOPE: CPT | Performed by: STUDENT IN AN ORGANIZED HEALTH CARE EDUCATION/TRAINING PROGRAM

## 2025-07-15 PROBLEM — R87.810 ASCUS WITH POSITIVE HIGH RISK HPV CERVICAL: Status: ACTIVE | Noted: 2025-07-15

## 2025-07-15 PROBLEM — R87.610 ASCUS WITH POSITIVE HIGH RISK HPV CERVICAL: Status: ACTIVE | Noted: 2025-07-15

## 2025-07-15 NOTE — PROGRESS NOTES
Subjective   Patient ID: Krystina Wheatley is a 29 y.o. female who presents for Follow-up (Beti Gonzalezit PT, needs colp, denies new problems.).  Patient presents for colp due to persistent ASCUS with HPV other positive. No fevers, chills. No HA/vision changes. No RUQ pain, n/v. No chest pain or SOB. No calf pain.          Review of Systems   All other systems reviewed and are negative.      Objective   Physical Exam  General: A&Ox3  Head: Normocephalic, atraumatic  Heart/Lungs: RRR, No murmurs, gallops, or rubs. Lungs are CTAB.  Abdomen: Soft, nontender. BS+4. No bruising or masses.  Genitourinary: Labia and vagina normal in appearance. Uterus is small, mobile, anteverted. No adnexal masses palpated.  Lower Extremities: No lower extremity Edema no palpable cords.     A chaperone was present for the exam.    Assessment/Plan   Problem List Items Addressed This Visit       ASCUS with positive high risk HPV cervical    Overview   Elmore performed. Hypervascular lesion at 1 o clock, small acetowhite at 6 o clock.         Relevant Orders    Surgical Pathology Exam    POCT pregnancy, urine manually resulted (Completed)     Other Visit Diagnoses         Pregnancy test negative              Patient ID: Krystina Wheatley is a 29 y.o. female.    Colposcopy    Date/Time: 7/15/2025 7:42 PM    Performed by: Jung Rubalcava MD  Authorized by: Jung Rubalcava MD    Consent:     Patient questions answered: yes      Risks and benefits of the procedure and its alternatives discussed: yes      Procedural risks discussed:  Infection and bleeding    Consent obtained:  Verbal and written    Consent given by:  Patient  Universal Protocol:     A time out verifies correct patient, procedure, equipment, support staff, and site/side marked as required: yes      Patient states understanding of procedure being performed: yes      Relevant documents present and verified: yes      Test results available and properly labeled: yes      Required  blood products, implants, devices, and special equipment available: yes    Pre-procedure:     Prep solution(s): acetic acid    Procedure:     Colposcopy with: cervical biopsy and endocervical curettage      Colposcopy details:  Hypervascular lesion at 1 o clock, small acetowhite at 6 o clock.    Cervix visibility: fully visualized      SCJ visibility: fully visualized      Lesion visualized: fully visualized    Post-procedure:     Patient tolerance of procedure:  Patient tolerated the procedure well with no immediate complications           Jung Rubalcava MD 07/15/25 7:41 PM

## 2025-07-23 ENCOUNTER — INFUSION (OUTPATIENT)
Dept: INFUSION THERAPY | Facility: HOSPITAL | Age: 30
End: 2025-07-23
Payer: MEDICAID

## 2025-07-23 VITALS
HEART RATE: 86 BPM | RESPIRATION RATE: 16 BRPM | TEMPERATURE: 96.6 F | SYSTOLIC BLOOD PRESSURE: 120 MMHG | DIASTOLIC BLOOD PRESSURE: 74 MMHG | OXYGEN SATURATION: 99 %

## 2025-07-23 DIAGNOSIS — K50.10 CROHN'S DISEASE OF LARGE INTESTINE WITHOUT COMPLICATION (MULTI): ICD-10-CM

## 2025-07-23 LAB
LABORATORY COMMENT REPORT: NORMAL
PATH REPORT.FINAL DX SPEC: NORMAL
PATH REPORT.GROSS SPEC: NORMAL
PATH REPORT.RELEVANT HX SPEC: NORMAL
PATH REPORT.TOTAL CANCER: NORMAL

## 2025-07-23 PROCEDURE — 2500000004 HC RX 250 GENERAL PHARMACY W/ HCPCS (ALT 636 FOR OP/ED): Performed by: INTERNAL MEDICINE

## 2025-07-23 PROCEDURE — 96365 THER/PROPH/DIAG IV INF INIT: CPT | Mod: INF

## 2025-07-23 RX ORDER — HEPARIN 100 UNIT/ML
500 SYRINGE INTRAVENOUS AS NEEDED
OUTPATIENT
Start: 2025-07-23

## 2025-07-23 RX ORDER — FAMOTIDINE 10 MG/ML
20 INJECTION, SOLUTION INTRAVENOUS ONCE AS NEEDED
OUTPATIENT
Start: 2025-08-20

## 2025-07-23 RX ORDER — ALBUTEROL SULFATE 0.83 MG/ML
3 SOLUTION RESPIRATORY (INHALATION) AS NEEDED
OUTPATIENT
Start: 2025-08-20

## 2025-07-23 RX ORDER — DIPHENHYDRAMINE HYDROCHLORIDE 50 MG/ML
50 INJECTION, SOLUTION INTRAMUSCULAR; INTRAVENOUS AS NEEDED
OUTPATIENT
Start: 2025-08-20

## 2025-07-23 RX ORDER — EPINEPHRINE 0.3 MG/.3ML
0.3 INJECTION SUBCUTANEOUS EVERY 5 MIN PRN
OUTPATIENT
Start: 2025-08-20

## 2025-07-23 RX ORDER — HEPARIN 100 UNIT/ML
500 SYRINGE INTRAVENOUS AS NEEDED
Status: DISCONTINUED | OUTPATIENT
Start: 2025-07-23 | End: 2025-07-23 | Stop reason: HOSPADM

## 2025-07-23 RX ORDER — HEPARIN SODIUM 1000 [USP'U]/ML
2000 INJECTION, SOLUTION INTRAVENOUS; SUBCUTANEOUS AS NEEDED
OUTPATIENT
Start: 2025-07-23

## 2025-07-23 RX ORDER — HEPARIN SODIUM,PORCINE/PF 10 UNIT/ML
50 SYRINGE (ML) INTRAVENOUS AS NEEDED
OUTPATIENT
Start: 2025-07-23

## 2025-07-23 RX ADMIN — VEDOLIZUMAB 300 MG: 300 INJECTION, POWDER, LYOPHILIZED, FOR SOLUTION INTRAVENOUS at 12:57

## 2025-07-23 RX ADMIN — Medication 500 UNITS: at 13:38

## 2025-07-23 ASSESSMENT — ENCOUNTER SYMPTOMS
LOSS OF SENSATION IN FEET: 0
DEPRESSION: 0
OCCASIONAL FEELINGS OF UNSTEADINESS: 0

## 2025-08-01 ENCOUNTER — APPOINTMENT (OUTPATIENT)
Dept: OBSTETRICS AND GYNECOLOGY | Facility: CLINIC | Age: 30
End: 2025-08-01
Payer: MEDICAID

## 2025-08-01 VITALS — SYSTOLIC BLOOD PRESSURE: 110 MMHG | BODY MASS INDEX: 27.55 KG/M2 | DIASTOLIC BLOOD PRESSURE: 70 MMHG | WEIGHT: 163 LBS

## 2025-08-01 DIAGNOSIS — N93.8 OTHER SPECIFIED ABNORMAL UTERINE AND VAGINAL BLEEDING: ICD-10-CM

## 2025-08-01 DIAGNOSIS — Z01.419 ENCOUNTER FOR WELL WOMAN EXAM WITH ROUTINE GYNECOLOGICAL EXAM: Primary | ICD-10-CM

## 2025-08-01 PROCEDURE — 99395 PREV VISIT EST AGE 18-39: CPT | Performed by: NURSE PRACTITIONER

## 2025-08-01 RX ORDER — DROSPIRENONE AND ETHINYL ESTRADIOL 0.03MG-3MG
1 KIT ORAL DAILY
Qty: 84 TABLET | Refills: 3 | Status: SHIPPED | OUTPATIENT
Start: 2025-08-01

## 2025-08-01 NOTE — PROGRESS NOTES
HPI:   Krystina Wheatley is a 29 y.o. who presents today for her annual gynecologic exam without complaints    She has the following concerns; None. No GYN concerns. She is doing well with her OCP. Had a recent colposcopy on 7/14/25 showed CELENA 1.   Hx of crohn's, has a port placed for infusions.   On Joslyn. Doing well with this pill. She is not sexually active. Plans to use condoms if she becomes active.   .   GYN HISTORY:  Periods are regular every 28-30 days, lasting 4 days.   Dysmenorrhea:none. Cyclic symptoms include fatigue.   No intermenstrual bleeding, spotting, or discharge.    Current contraception: abstinence      Requests STD testing: no     PAP History   Last pap:   2025 ASCUS HPV Positive other  Colposocpy showed CELENA 1  2024- NILM and positive HPV.   2025- ASCUS, + HPV.   Colposcopy showed CELENA 1   History of abnormal pap: yes - as above  HPV vaccine: yes -    @paphx@    Health Screening  Family history of breast, uterine, ovarian or colon cancer: yes - paternal grandmother has a hx of ovarian cancer.      Colon cancer: per GI      The patient feels safe at home.         Review of Systems:   Constitutional: no fever and no chills.  Cardiovascular: no chest pain.   Respiratory: no shortness of breath.   Gastrointestinal: no nausea, no abdominal pain and no constipation  Genitourinary: no dysuria, no urinary incontinence, no vaginal dryness, no pelvic pain and no vaginal discharge.   Neurological: no headache.  Psychiatric: no anxiety and no depression.              Objective         /70   Wt 73.9 kg (163 lb)   LMP 07/18/2025 (Approximate)   BMI 27.55 kg/m²         Physical Exam:   Constitutional: Alert and in no acute distress. Well developed, well nourished.      Neck: No neck asymmetry. Supple. Thyroid not enlarged and there were no palpable thyroid nodules.      Cardiovascular: Heart rate and rhythm were normal, normal S1 and S2, no gallops, and no murmurs.      Pulmonary: No respiratory  distress. Clear bilateral breath sounds.      Chest: Breasts: Normal appearance, no nipple discharge and no skin changes. Palpation of breasts and axillae: No palpable mass and no axillary lymphadenopathy.      Abdomen: Soft nontender; no abdominal mass palpated. Normal bowel sounds. No organomegaly.      Genitourinary:   - External genitalia: Normal.   - Palpation of lymph nodes in groin: No inguinal lymphadenopathy.   - Bartholin's Urethral and Skenes Glands: Normal.   - Urethra: Normal.    -Bladder: Normal on palpation.   - Vagina: Normal.   - Cervix: Normal.   - Uterus: Normal. Right Adnexa/parametria: Normal. Left Adnexa/parametria: Normal.   - Perianal Area: Normal.      Skin: Normal skin color and pigmentation, normal skin turgor, and no rash     Psychiatric: Alert and oriented x 3. Affect normal to patient baseline. Mood: Appropriate.           Assessment/Plan   Diagnoses and all orders for this visit:  Encounter for well woman exam with routine gynecological exam  Here for well woman exam. She is doing well. OCP refills sent. She will use condoms as backup for pregnancy prevention. Will plan on repeat PAP in April 2026.   Other specified abnormal uterine and vaginal bleeding  -     drospirenone-ethinyl estradioL (Kady, Ocella) 3-0.03 mg tablet; Take 1 tablet by mouth once daily.  Follow-up annually; sooner if needed.  Follow-up for PAP in April.        MATILDA Merida-CNP 08/04/25 4:39 PM

## 2025-08-12 ENCOUNTER — TELEPHONE (OUTPATIENT)
Facility: CLINIC | Age: 30
End: 2025-08-12
Payer: MEDICAID

## 2025-08-12 DIAGNOSIS — K50.10 CROHN'S DISEASE OF LARGE INTESTINE WITHOUT COMPLICATION (MULTI): Primary | ICD-10-CM

## 2025-08-20 ENCOUNTER — APPOINTMENT (OUTPATIENT)
Dept: INFUSION THERAPY | Facility: HOSPITAL | Age: 30
End: 2025-08-20
Payer: MEDICAID

## 2025-08-22 ENCOUNTER — INFUSION (OUTPATIENT)
Dept: INFUSION THERAPY | Facility: HOSPITAL | Age: 30
End: 2025-08-22
Payer: MEDICAID

## 2025-08-22 VITALS
HEART RATE: 88 BPM | RESPIRATION RATE: 18 BRPM | TEMPERATURE: 97.4 F | SYSTOLIC BLOOD PRESSURE: 125 MMHG | DIASTOLIC BLOOD PRESSURE: 83 MMHG | OXYGEN SATURATION: 98 %

## 2025-08-22 DIAGNOSIS — K50.10 CROHN'S DISEASE OF LARGE INTESTINE WITHOUT COMPLICATION (MULTI): ICD-10-CM

## 2025-08-22 DIAGNOSIS — K50.10 CROHN'S DISEASE OF LARGE INTESTINE WITHOUT COMPLICATION (MULTI): Primary | ICD-10-CM

## 2025-08-22 PROCEDURE — 2500000004 HC RX 250 GENERAL PHARMACY W/ HCPCS (ALT 636 FOR OP/ED): Performed by: INTERNAL MEDICINE

## 2025-08-22 PROCEDURE — 96365 THER/PROPH/DIAG IV INF INIT: CPT | Mod: INF

## 2025-08-22 RX ORDER — EPINEPHRINE 0.3 MG/.3ML
0.3 INJECTION SUBCUTANEOUS EVERY 5 MIN PRN
OUTPATIENT
Start: 2025-09-17

## 2025-08-22 RX ORDER — HEPARIN SODIUM,PORCINE/PF 10 UNIT/ML
50 SYRINGE (ML) INTRAVENOUS AS NEEDED
OUTPATIENT
Start: 2025-08-22

## 2025-08-22 RX ORDER — FAMOTIDINE 10 MG/ML
20 INJECTION, SOLUTION INTRAVENOUS ONCE AS NEEDED
OUTPATIENT
Start: 2025-09-17

## 2025-08-22 RX ORDER — ALBUTEROL SULFATE 0.83 MG/ML
3 SOLUTION RESPIRATORY (INHALATION) AS NEEDED
OUTPATIENT
Start: 2025-09-17

## 2025-08-22 RX ORDER — DIPHENHYDRAMINE HYDROCHLORIDE 50 MG/ML
50 INJECTION, SOLUTION INTRAMUSCULAR; INTRAVENOUS AS NEEDED
OUTPATIENT
Start: 2025-09-17

## 2025-08-22 RX ORDER — HEPARIN SODIUM 1000 [USP'U]/ML
2000 INJECTION, SOLUTION INTRAVENOUS; SUBCUTANEOUS AS NEEDED
OUTPATIENT
Start: 2025-08-22

## 2025-08-22 RX ORDER — HEPARIN 100 UNIT/ML
500 SYRINGE INTRAVENOUS AS NEEDED
Status: DISCONTINUED | OUTPATIENT
Start: 2025-08-22 | End: 2025-08-22 | Stop reason: HOSPADM

## 2025-08-22 RX ORDER — HEPARIN 100 UNIT/ML
500 SYRINGE INTRAVENOUS AS NEEDED
OUTPATIENT
Start: 2025-08-22

## 2025-08-22 RX ADMIN — VEDOLIZUMAB 300 MG: 300 INJECTION, POWDER, LYOPHILIZED, FOR SOLUTION INTRAVENOUS at 10:09

## 2025-08-22 RX ADMIN — Medication 500 UNITS: at 10:45

## 2025-08-22 ASSESSMENT — ENCOUNTER SYMPTOMS
DEPRESSION: 0
LOSS OF SENSATION IN FEET: 0
OCCASIONAL FEELINGS OF UNSTEADINESS: 0

## 2025-08-23 LAB
HBV CORE IGM SERPL QL IA: NORMAL
HBV SURFACE AG SERPL QL IA: NORMAL
HCV AB SERPL QL IA: NORMAL
IGNF NEG CNTRL BLD: NORMAL
M TB IFN-G BLD-IMP: NORMAL
MITOGEN IGNF.SPOT COUNT BLD: NORMAL
QUEST PANEL A SPOT COUNT: NORMAL
QUEST PANEL B SPOT COUNT: NORMAL

## 2025-08-25 LAB
HBV CORE IGM SERPL QL IA: NORMAL
HBV SURFACE AG SERPL QL IA: NORMAL
HCV AB SERPL QL IA: NORMAL
IGNF NEG CNTRL BLD: NORMAL
M TB IFN-G BLD-IMP: NEGATIVE
MITOGEN IGNF.SPOT COUNT BLD: NORMAL
QUEST PANEL A SPOT COUNT: 0
QUEST PANEL B SPOT COUNT: 1

## 2025-09-17 ENCOUNTER — APPOINTMENT (OUTPATIENT)
Dept: INFUSION THERAPY | Facility: HOSPITAL | Age: 30
End: 2025-09-17
Payer: MEDICAID

## 2025-10-15 ENCOUNTER — APPOINTMENT (OUTPATIENT)
Dept: INFUSION THERAPY | Facility: HOSPITAL | Age: 30
End: 2025-10-15
Payer: MEDICAID

## 2025-11-12 ENCOUNTER — APPOINTMENT (OUTPATIENT)
Dept: INFUSION THERAPY | Facility: HOSPITAL | Age: 30
End: 2025-11-12
Payer: MEDICAID

## 2025-12-10 ENCOUNTER — APPOINTMENT (OUTPATIENT)
Dept: INFUSION THERAPY | Facility: HOSPITAL | Age: 30
End: 2025-12-10
Payer: MEDICAID

## 2026-01-07 ENCOUNTER — APPOINTMENT (OUTPATIENT)
Dept: INFUSION THERAPY | Facility: HOSPITAL | Age: 31
End: 2026-01-07
Payer: MEDICAID

## 2026-02-12 ENCOUNTER — APPOINTMENT (OUTPATIENT)
Dept: OBSTETRICS AND GYNECOLOGY | Facility: CLINIC | Age: 31
End: 2026-02-12
Payer: MEDICAID

## 2026-04-24 ENCOUNTER — APPOINTMENT (OUTPATIENT)
Dept: OBSTETRICS AND GYNECOLOGY | Facility: CLINIC | Age: 31
End: 2026-04-24
Payer: MEDICAID

## 2026-08-07 ENCOUNTER — APPOINTMENT (OUTPATIENT)
Dept: OBSTETRICS AND GYNECOLOGY | Facility: CLINIC | Age: 31
End: 2026-08-07
Payer: MEDICAID